# Patient Record
Sex: MALE | Employment: OTHER | URBAN - METROPOLITAN AREA
[De-identification: names, ages, dates, MRNs, and addresses within clinical notes are randomized per-mention and may not be internally consistent; named-entity substitution may affect disease eponyms.]

---

## 2017-08-01 ENCOUNTER — HOSPITAL ENCOUNTER (OUTPATIENT)
Age: 67
Setting detail: SPECIMEN
Discharge: HOME | End: 2017-08-01
Payer: MEDICARE

## 2017-08-04 LAB — SURGICAL PATHOLOGY REPORT: NORMAL

## 2017-08-24 ENCOUNTER — HOSPITAL ENCOUNTER (OUTPATIENT)
Age: 67
Discharge: HOME | End: 2017-08-24
Payer: MEDICARE

## 2017-08-24 ENCOUNTER — HOSPITAL ENCOUNTER (OUTPATIENT)
Age: 67
Setting detail: SPECIMEN
Discharge: HOME | End: 2017-08-24
Payer: MEDICARE

## 2017-08-24 PROCEDURE — 88305 TISSUE EXAM BY PATHOLOGIST: CPT

## 2017-08-28 LAB — SURGICAL PATHOLOGY REPORT: NORMAL

## 2017-09-01 ENCOUNTER — HOSPITAL ENCOUNTER (OUTPATIENT)
Age: 67
Setting detail: SPECIMEN
Discharge: HOME | End: 2017-09-01
Payer: MEDICARE

## 2017-09-07 LAB — SURGICAL PATHOLOGY REPORT: NORMAL

## 2020-07-15 ENCOUNTER — HOSPITAL ENCOUNTER (OUTPATIENT)
Dept: MRI IMAGING | Facility: CLINIC | Age: 70
Discharge: HOME | End: 2020-07-17
Payer: MEDICARE

## 2020-07-15 PROCEDURE — 73721 MRI JNT OF LWR EXTRE W/O DYE: CPT

## 2022-06-01 ENCOUNTER — OFFICE VISIT (OUTPATIENT)
Dept: ORTHOPEDIC SURGERY | Age: 72
End: 2022-06-01

## 2022-06-01 VITALS — WEIGHT: 305 LBS | RESPIRATION RATE: 16 BRPM | BODY MASS INDEX: 42.7 KG/M2 | HEIGHT: 71 IN

## 2022-06-01 DIAGNOSIS — M25.562 LEFT KNEE PAIN, UNSPECIFIED CHRONICITY: Primary | ICD-10-CM

## 2022-06-01 PROCEDURE — 99204 OFFICE O/P NEW MOD 45 MIN: CPT | Performed by: ORTHOPAEDIC SURGERY

## 2022-06-01 RX ORDER — BISOPROLOL FUMARATE 5 MG/1
TABLET ORAL
COMMUNITY
Start: 2020-09-09 | End: 2022-07-21

## 2022-06-01 RX ORDER — FLUTICASONE PROPIONATE 50 MCG
SPRAY, SUSPENSION (ML) NASAL
COMMUNITY
Start: 2020-07-07

## 2022-06-01 RX ORDER — BISOPROLOL FUMARATE AND HYDROCHLOROTHIAZIDE 5; 6.25 MG/1; MG/1
TABLET ORAL
COMMUNITY
Start: 2022-04-28 | End: 2022-07-21

## 2022-06-01 RX ORDER — ZOLPIDEM TARTRATE 10 MG/1
10 TABLET ORAL NIGHTLY PRN
COMMUNITY
Start: 2022-05-09

## 2022-06-01 RX ORDER — MESALAMINE 1.2 G/1
1200 TABLET, DELAYED RELEASE ORAL
COMMUNITY
Start: 2022-05-03

## 2022-06-01 RX ORDER — HYDROCODONE BITARTRATE AND ACETAMINOPHEN 5; 325 MG/1; MG/1
1 TABLET ORAL PRN
COMMUNITY
Start: 2022-04-19

## 2022-06-01 ASSESSMENT — ENCOUNTER SYMPTOMS
SHORTNESS OF BREATH: 0
EYE DISCHARGE: 0
ROS SKIN COMMENTS: NEGATIVE FOR RASH
ABDOMINAL PAIN: 0

## 2022-06-01 NOTE — PROGRESS NOTES
[address] [facility] ORTHOPEDICS AND SPORTS MEDICINE  [address] [facility] [address] [address]  Dept: [phone]    Ambulatory Orthopedic Consult      CHIEF COMPLAINT:    Chief Complaint   Patient presents with   Bailey Established New Doctor    Pain     left knee       HISTORY OF PRESENT ILLNESS:      The patient is a 67 y.o. male who is being seen at the request of  Mary Pinzon MD for consultation and evaluation of knee pain. Patient reportedly has had progressively worsening left knee pain over the last 20 years. He has had multiple injections in the past and physical therapy. He has been seeing Dr. Jess Perry and in fact was in the process of scheduling a knee replacement when his insurance changed. He would like to consider total knee arthroplasty. The knee bothers him for usual activities of daily living, it is no longer improving with home exercise program and corticosteroid injections, and causing him mobility issues and the feeling that he may fall because of his knee. .      Past Medical History:    No past medical history on file. Past Surgical History:    No past surgical history on file. Current Medications:   Current Outpatient Medications   Medication Sig Dispense Refill    bisoprolol (ZEBETA) 5 MG tablet       bisoprolol-hydroCHLOROthiazide (ZIAC) 5-6.25 MG per tablet       vitamin D (CHOLECALCIFEROL) 125 MCG (5000 UT) CAPS capsule Take 5,000 Units by mouth      diclofenac (VOLTAREN) 50 MG EC tablet       fluticasone (FLONASE) 50 MCG/ACT nasal spray       HYDROcodone-acetaminophen (NORCO) 5-325 MG per tablet       zolpidem (AMBIEN) 10 MG tablet       mesalamine (LIALDA) 1.2 g EC tablet        No current facility-administered medications for this visit.        Allergies:    Adhesive tape    Social History:   Social History     Socioeconomic History    Marital status: Unknown     Spouse name: Not on file    Number of children: Not on file    Years of education: Not on file    Highest education level: Not on file   Occupational History    Not on file   Tobacco Use    Smoking status: Not on file    Smokeless tobacco: Not on file   Substance and Sexual Activity    Alcohol use: Not on file    Drug use: Not on file    Sexual activity: Not on file   Other Topics Concern    Not on file   Social History Narrative    Not on file     Social Determinants of Health     Financial Resource Strain:     Difficulty of Paying Living Expenses: Not on file   Food Insecurity:     Worried About Running Out of Food in the Last Year: Not on file    Faisal of Food in the Last Year: Not on file   Transportation Needs:     Lack of Transportation (Medical): Not on file    Lack of Transportation (Non-Medical): Not on file   Physical Activity:     Days of Exercise per Week: Not on file    Minutes of Exercise per Session: Not on file   Stress:     Feeling of Stress : Not on file   Social Connections:     Frequency of Communication with Friends and Family: Not on file    Frequency of Social Gatherings with Friends and Family: Not on file    Attends Sabianist Services: Not on file    Active Member of [address] [place] [address] or Organizations: Not on file    Attends Club or Organization Meetings: Not on file    Marital Status: Not on file   Intimate Partner Violence:     Fear of Current or Ex-Partner: Not on file    Emotionally Abused: Not on file    Physically Abused: Not on file    Sexually Abused: Not on file   Housing Stability:     Unable to Pay for Housing in the Last Year: Not on file    Number of [place] in the Last Year: Not on file    Unstable Housing in the Last Year: Not on file       Family History:  No family history on file. REVIEW OF SYSTEMS:  Review of Systems   Constitutional: Positive for activity change. Negative for fever. HENT: Negative for dental problem. Eyes: Negative for discharge.    Respiratory: Negative for shortness of breath. Cardiovascular: Negative for chest pain. Gastrointestinal: Negative for abdominal pain. Genitourinary: Negative. Musculoskeletal: Positive for arthralgias. Skin:        Negative for rash   Neurological: Positive for weakness. Psychiatric/Behavioral: Negative for confusion. I have reviewed the CC, HPI, ROS, PMH, FHX, Social History, and if not present in this note, I have reviewed in the patient's chart. I agree with the documentation provided by other staff and have reviewed their documentation prior to providing my signature indicating agreement. PHYSICAL EXAM:  Resp 16   Ht 5' 11\" (1.803 m)   Wt (!) 305 lb (138.3 kg)   BMI 42.54 kg/m²  Body mass index is 42.54 kg/m². Physical Exam  Gen: alert and oriented to person and place. Psych:  Appropriate affect; Appropriate knowledge base; Appropriate mood; No hallucinations; Head: normocephalic, atraumatic   Chest: symmetric chest excursion; nonlabored respiratory effort. Pelvis: stable; no obvious pelvis deformity  Ortho Exam  Extremity:Evaluation of the Left knee reveals no significant outward deformity. There is no erythema, warmth, skin lesions, signs of infection. There is tenderness over the medial joint line. There is a mildknee effusion. Range of motion of the Left knee is  5-full. No instability of the knee is appreciated at 0 and 30° of flexion. There is a negative anterior drawer Lachman's test.  There is increased pain with varus Kuar's testing. No calf tenderness is noted. There is a negative hip log roll and Stinchfield test.  Motor, sensory, vascular examination to the Left lower extremity is intact. Patient has full range of motion of the ankle. No pain at the patellofemoral joint is appreciated.       Radiology: XR KNEE LEFT (MIN 4 VIEWS)    Result Date: 6/1/2022  History:   Left knee pain Findings:   Standing AP/Lateral/Tunnel/Merchant view xrays of the Left done in the office today shows severe  medial joint space narrowing, tricompartmental osteophytosis, joint line sclerosis medially. No evidence of fracture, subluxation, dislocation, radioopaque foreign body/tumor is noted. Lateral subluxation of the tibia is appreciated. Impression:   Left knee severe  degenerative changes as described above. ASSESSMENT:     1. Left knee pain, unspecified chronicity         PLAN:       The patient would like to proceed with total knee arthroplasty on the left. We discussed not resurfacing the patella the patient notes understanding and states he has no patellofemoral pain and he would prefer a nonresurfaced patella. We also discussed cementing versus noncemented prosthesis. He notes understanding. All details of the procedure, as well as risks, benefits and alternatives, including the option of non operative versus operative treatment were discussed. The patient understands that risks of the surgery include but are not limited to: bleeding, malunion/nonunion, loss of fixation, loss of reduction, hardware failure, angular or rotational deformity, length discrepancy, limp, transfusion, skin blistering or breakdown, progressive post traumatic degenerative joint disease, possible need for further surgery, bone grafting, infection, nerve injury, paralysis, numbness, blood vessel injury, excessive scaring, wound complication or breakdown, failure of symptoms to improve or actual deterioration in condition, significant acute and/or chronic pain, possible need for amputation, permanent loss of motion, and permanent loss of function.   As well as the general complications of anesthesia, which include but are not limited to: myocardial infarction and/or heart attack, stroke, multi organ system failure or even possible death, prolonged hospital stay, blood clots, pulmonary embolism, abnormal reaction to medication, visual and neurological disturbances, constipation, ischemic bowel, bowel obstruction, bowel perforation, ileus and mental status changes. No guarantees were made. No follow-ups on file. No orders of the defined types were placed in this encounter.       Orders Placed This Encounter   Procedures    XR KNEE LEFT (MIN 4 VIEWS)     Standing Status:   Future     Number of Occurrences:   1     Standing Expiration Date:   5/31/2023       This note is created with the assistance of a speech recognition program.  While intending to generate a document that actually reflects the content of the visit, the document can still have some errors including those of syntax and sound a like substitutions which may escape proof reading.  In such instances, actual meaning can be extrapolated by contextual diversion      Electronically signed by Nora Abrams on 6/1/2022 at 10:22 AM

## 2022-06-01 NOTE — LETTER
[facility] Ankit Frias, [address] [facility]  [address] [address] [employer] [address] [place]  [facility] [address] [address]  Dept: [phone]  Dept Fax: [phone]        6/1/22    Patient: Sanjeev Quiroz  YOB: 1950    Dear Anisa Wilkerson MD,    I had the pleasure of seeing one of your patients, Kory Aldana today in the office. Below are the relevant portions of my assessment and plan of care. IMPRESSION:  1. Left knee pain, unspecified chronicity      PLAN:  The patient would like to proceed with total knee arthroplasty on the left. We discussed not resurfacing the patella the patient notes understanding and states he has no patellofemoral pain and he would prefer a nonresurfaced patella. We also discussed cementing versus noncemented prosthesis. He notes understanding. Thank you for allowing me to participate in the care of this patient. I will keep you updated on this patient's follow up and I look forward to serving you and your patients again in the future. Please don't hesitate to contact me at my mobile number [phone].         Consuelo Stevens

## 2022-06-15 ENCOUNTER — TELEPHONE (OUTPATIENT)
Dept: ORTHOPEDIC SURGERY | Age: 72
End: 2022-06-15

## 2022-06-15 DIAGNOSIS — Z01.818 PRE-OP TESTING: Primary | ICD-10-CM

## 2022-07-21 ENCOUNTER — HOSPITAL ENCOUNTER (OUTPATIENT)
Dept: GENERAL RADIOLOGY | Age: 72
Discharge: HOME | End: 2022-07-23
Payer: MEDICARE

## 2022-07-21 ENCOUNTER — HOSPITAL ENCOUNTER (OUTPATIENT)
Dept: PREADMISSION TESTING | Age: 72
Discharge: HOME | End: 2022-07-25
Payer: MEDICARE

## 2022-07-21 VITALS
BODY MASS INDEX: 37.25 KG/M2 | DIASTOLIC BLOOD PRESSURE: 90 MMHG | HEIGHT: 72 IN | WEIGHT: 275 LBS | HEART RATE: 75 BPM | TEMPERATURE: 97.7 F | OXYGEN SATURATION: 97 % | SYSTOLIC BLOOD PRESSURE: 147 MMHG | RESPIRATION RATE: 20 BRPM

## 2022-07-21 DIAGNOSIS — Z01.818 PRE-OP TESTING: ICD-10-CM

## 2022-07-21 LAB
-: ABNORMAL
ABO/RH: NORMAL
ALBUMIN SERPL-MCNC: 3.8 G/DL (ref 3.5–5.2)
ALP BLD-CCNC: 88 U/L (ref 40–129)
ALT SERPL-CCNC: 25 U/L (ref 5–41)
ANION GAP SERPL CALCULATED.3IONS-SCNC: 9 MMOL/L (ref 9–17)
ANTIBODY SCREEN: NEGATIVE
ARM BAND NUMBER: NORMAL
AST SERPL-CCNC: 27 U/L
BILIRUB SERPL-MCNC: 0.48 MG/DL (ref 0.3–1.2)
BILIRUBIN URINE: NEGATIVE
BUN BLDV-MCNC: 22 MG/DL (ref 8–23)
BUN/CREAT BLD: 16 (ref 9–20)
CALCIUM SERPL-MCNC: 9.4 MG/DL (ref 8.6–10.4)
CASTS UA: ABNORMAL /LPF
CASTS UA: ABNORMAL /LPF
CHLORIDE BLD-SCNC: 105 MMOL/L (ref 98–107)
CO2: 27 MMOL/L (ref 20–31)
COLOR: YELLOW
CREAT SERPL-MCNC: 1.36 MG/DL (ref 0.7–1.2)
EPITHELIAL CELLS UA: ABNORMAL /HPF (ref 0–5)
EXPIRATION DATE: NORMAL
GFR AFRICAN AMERICAN: >60 ML/MIN
GFR NON-AFRICAN AMERICAN: 51 ML/MIN
GFR SERPL CREATININE-BSD FRML MDRD: ABNORMAL ML/MIN/{1.73_M2}
GLUCOSE BLD-MCNC: 92 MG/DL (ref 70–99)
GLUCOSE URINE: NEGATIVE
HCT VFR BLD CALC: 47.5 % (ref 40.7–50.3)
HEMOGLOBIN: 15.3 G/DL (ref 13–17)
KETONES, URINE: ABNORMAL
LEUKOCYTE ESTERASE, URINE: NEGATIVE
MCH RBC QN AUTO: 30.8 PG (ref 25.2–33.5)
MCHC RBC AUTO-ENTMCNC: 32.2 G/DL (ref 28.4–34.8)
MCV RBC AUTO: 95.6 FL (ref 82.6–102.9)
MUCUS: ABNORMAL
NITRITE, URINE: NEGATIVE
NRBC AUTOMATED: 0 PER 100 WBC
PDW BLD-RTO: 12 % (ref 11.8–14.4)
PH UA: 5.5 (ref 5–8)
PLATELET # BLD: 212 K/UL (ref 138–453)
PMV BLD AUTO: 10.3 FL (ref 8.1–13.5)
POTASSIUM SERPL-SCNC: 4.7 MMOL/L (ref 3.7–5.3)
PROTEIN UA: ABNORMAL
RBC # BLD: 4.97 M/UL (ref 4.21–5.77)
RBC UA: ABNORMAL /HPF (ref 0–2)
SODIUM BLD-SCNC: 141 MMOL/L (ref 135–144)
SPECIFIC GRAVITY UA: 1.03 (ref 1–1.03)
TOTAL PROTEIN: 6.9 G/DL (ref 6.4–8.3)
TURBIDITY: ABNORMAL
URINE HGB: NEGATIVE
UROBILINOGEN, URINE: NORMAL
WBC # BLD: 7 K/UL (ref 3.5–11.3)
WBC UA: ABNORMAL /HPF (ref 0–5)

## 2022-07-21 PROCEDURE — 87641 MR-STAPH DNA AMP PROBE: CPT

## 2022-07-21 PROCEDURE — 71046 X-RAY EXAM CHEST 2 VIEWS: CPT

## 2022-07-21 PROCEDURE — 81001 URINALYSIS AUTO W/SCOPE: CPT

## 2022-07-21 PROCEDURE — 85027 COMPLETE CBC AUTOMATED: CPT

## 2022-07-21 PROCEDURE — 86850 RBC ANTIBODY SCREEN: CPT

## 2022-07-21 PROCEDURE — 86900 BLOOD TYPING SEROLOGIC ABO: CPT

## 2022-07-21 PROCEDURE — 86901 BLOOD TYPING SEROLOGIC RH(D): CPT

## 2022-07-21 PROCEDURE — 80053 COMPREHEN METABOLIC PANEL: CPT

## 2022-07-21 PROCEDURE — 93005 ELECTROCARDIOGRAM TRACING: CPT

## 2022-07-21 PROCEDURE — 36415 COLL VENOUS BLD VENIPUNCTURE: CPT

## 2022-07-21 RX ORDER — CELECOXIB 200 MG/1
200 CAPSULE ORAL ONCE
Status: CANCELLED | OUTPATIENT
Start: 2022-08-02

## 2022-07-21 RX ORDER — GABAPENTIN 300 MG/1
300 CAPSULE ORAL ONCE
Status: CANCELLED | OUTPATIENT
Start: 2022-08-02

## 2022-07-21 RX ORDER — ACETAMINOPHEN 500 MG
1000 TABLET ORAL ONCE
Status: CANCELLED | OUTPATIENT
Start: 2022-08-02

## 2022-07-21 ASSESSMENT — PROMIS GLOBAL HEALTH SCALE
TO WHAT EXTENT ARE YOU ABLE TO CARRY OUT YOUR EVERYDAY PHYSICAL ACTIVITIES SUCH AS WALKING, CLIMBING STAIRS, CARRYING GROCERIES, OR MOVING A CHAIR [ON A SCALE OF 1 (NOT AT ALL) TO 5 (COMPLETELY)]?: 3
SUM OF RESPONSES TO QUESTIONS 3, 6, 7, & 8: 12
IN GENERAL, HOW WOULD YOU RATE YOUR SATISFACTION WITH YOUR SOCIAL ACTIVITIES AND RELATIONSHIPS [ON A SCALE OF 1 (POOR) TO 5 (EXCELLENT)]?: 5
SUM OF RESPONSES TO QUESTIONS 2, 4, 5, & 10: 14
IN GENERAL, PLEASE RATE HOW WELL YOU CARRY OUT YOUR USUAL SOCIAL ACTIVITIES (INCLUDES ACTIVITIES AT HOME, AT WORK, AND IN YOUR COMMUNITY, AND RESPONSIBILITIES AS A PARENT, CHILD, SPOUSE, EMPLOYEE, FRIEND, ETC) [ON A SCALE OF 1 (POOR) TO 5 (EXCELLENT)]?: 4
IN GENERAL, HOW WOULD YOU RATE YOUR MENTAL HEALTH, INCLUDING YOUR MOOD AND YOUR ABILITY TO THINK [ON A SCALE OF 1 (POOR) TO 5 (EXCELLENT)]?: 5
HOW IS THE PROMIS V1.1 BEING ADMINISTERED?: 0
IN THE PAST 7 DAYS, HOW OFTEN HAVE YOU BEEN BOTHERED BY EMOTIONAL PROBLEMS, SUCH AS FEELING ANXIOUS, DEPRESSED, OR IRRITABLE [ON A SCALE FROM 1 (NEVER) TO 5 (ALWAYS)]?: 1
IN GENERAL, WOULD YOU SAY YOUR HEALTH IS...[ON A SCALE OF 1 (POOR) TO 5 (EXCELLENT)]: 3
IN THE PAST 7 DAYS, HOW WOULD YOU RATE YOUR FATIGUE ON AVERAGE [ON A SCALE FROM 1 (NONE) TO 5 (VERY SEVERE)]?: 4
IN GENERAL, HOW WOULD YOU RATE YOUR PHYSICAL HEALTH [ON A SCALE OF 1 (POOR) TO 5 (EXCELLENT)]?: 3
WHO IS THE PERSON COMPLETING THE PROMIS V1.1 SURVEY?: 0
IN THE PAST 7 DAYS, HOW WOULD YOU RATE YOUR PAIN ON AVERAGE [ON A SCALE FROM 0 (NO PAIN) TO 10 (WORST IMAGINABLE PAIN)]?: 2
IN GENERAL, WOULD YOU SAY YOUR QUALITY OF LIFE IS...[ON A SCALE OF 1 (POOR) TO 5 (EXCELLENT)]: 3

## 2022-07-21 ASSESSMENT — KOOS JR
KOOS JR TOTAL INTERVAL SCORE: 52.465
STANDING UPRIGHT: 2
HOW SEVERE IS YOUR KNEE STIFFNESS AFTER FIRST WAKING IN MORNING: 2
BENDING TO THE FLOOR TO PICK UP OBJECT: 2
RISING FROM SITTING: 2
TWISING OR PIVOTING ON KNEE: 2
STRAIGHTENING KNEE FULLY: 2
GOING UP OR DOWN STAIRS: 2

## 2022-07-21 ASSESSMENT — PAIN DESCRIPTION - DESCRIPTORS: DESCRIPTORS: ACHING;CRUSHING

## 2022-07-21 ASSESSMENT — PAIN DESCRIPTION - LOCATION: LOCATION: KNEE

## 2022-07-21 ASSESSMENT — PAIN SCALES - GENERAL: PAINLEVEL_OUTOF10: 1

## 2022-07-21 ASSESSMENT — PAIN DESCRIPTION - ORIENTATION: ORIENTATION: LEFT

## 2022-07-21 NOTE — PRE-PROCEDURE INSTRUCTIONS
[address] [facility] ON August 2nd  Surgery time at 8:30  We will confirm time of arrival day before surgery    Once you enter the hospital lobby, take the elevators to the second floor. Check-In is at the surgery registration desk. Continue to take your home medications as you normally do up to and including the night before surgery with the exception of any blood thinning medications. Please stop any blood thinning medications as directed by your surgeon or prescribing physician. Failure to stop certain medications may interfere with your scheduled surgery. These may include:  Aspirin, Warfarin (Coumadin), Clopidogrel (Plavix), Ibuprofen (Motrin, Advil), Naproxen (Aleve), Meloxicam (Mobic), Celecoxib (Celebrex), Eliquis, Pradaxa, Xarelto, Effient, Fish Oil, Herbal supplements. Voltaren    Please take the following medication(s) the day of surgery with a small sip of water:none        PREPARING FOR YOUR SURGERY:     Before surgery, you can play an important role in your own health. Because skin is not sterile, we need to be sure that your skin is as free of germs as possible before surgery by carefully washing before surgery. Preparing or prepping skin before surgery can reduce the risk of a surgical site infection.   Do not shave the area of your body where your surgery will be performed unless you received specific permission from your physician. You will need to shower at home the night before surgery and the morning of surgery with a special soap called chlorhexidine gluconate (CHG*). *Not to be used by people allergic to Chlorhexidine Gluconate (CHG). Following these instructions will help you be sure that your skin is clean before surgery. Instructions on cleaning your skin before surgery: The night before your surgery: You will need to shower with warm water (not hot) and the CHG soap. Use a clean wash cloth and a clean towel.   Have clean clothes available to put on after the shower. First wash your hair with regular shampoo. Rinse your hair and body thoroughly to remove the shampoo. Wash your face with your regular soap or water only. Thoroughly rinse your body with warm water from the neck down. Turn water off to prevent rinsing the soap off too soon. With a clean wet washcloth and half of the CHG soap in the bottle, lather your entire body from the neck down. Do not use CHG soap near your eyes or ears to avoid injury to those areas. Wash thoroughly, paying special attention to the area where your surgery will be performed. Wash your body gently for five (5) minutes. Avoid scrubbing your skin too hard. Turn the water back on and rinse your body thoroughly. Pat yourself dry with a clean, soft towel. Do not apply lotion, cream or powder. Dress with clean freshly washed clothes. The morning of surgery:    Repeat shower following steps above - using remaining half of CHG soap in bottle. Patient Instructions: If you are having any type of anesthesia you are to have nothing to eat or drink after midnight the night before your surgery. This includes gum, mints, water or smoking or chewing tobacco.  The only exception to this is a small sip of water to take with any morning dose of heart, blood pressure, or seizure medications. No alcoholic beverages for 24 hours prior to surgery. Brush your teeth but do not swallow water. Bring your eyeglasses and case with you. No contacts are to be worn the day of surgery. You also may bring your hearing aids. Most surgical procedures involving anesthesia will require that you remove your dentures prior to surgery. Do not wear any jewelry or body piercings day of surgery. Also, NO lotion, perfume or deodorant to be used the day of surgery. No nail polish on the operative extremity (arm/leg surgeries)    Do not bring any valuables such as jewelry, cash, or credit cards.   If you are staying overnight with us, please bring a small bag of personal items. Please wear loose, comfortable clothing. If you are potentially going to have a cast or brace bring clothing that will fit over them. In case of illness - If you have cold or flu like symptoms (high fever, runny nose, sore throat, cough, etc.) rash, nausea, vomiting, loose stools, and/or recent contact with someone who has a contagious disease (chicken pox, measles, etc.) Please call your doctor before coming to the hospital.     Day of Surgery/Procedure:    As a patient at [facility] you can expect quality medical and nursing care that is centered on your individual needs. Our goal is to make your surgical experience as comfortable as possible    . Transportation After Your Surgery/Procedure: You will need a friend or family member to drive you home after your procedure. Your  must be 25years of age or older and able to sign off on your discharge instructions. A taxi cab or any other form of public transportation is not acceptable. Your friend or family member must stay at the hospital throughout your procedure. Someone must remain with you for the first 24 hours after your surgery if you receive anesthesia or medication. If you do not have someone to stay with you, your procedure may be cancelled.       If you have any other questions regarding your procedure or the day of surgery, please call [phone]      _________________________  ____________________________  Signature (Patient)              Signature (Provider) & date

## 2022-07-22 LAB
EKG ATRIAL RATE: 66 BPM
EKG P AXIS: 8 DEGREES
EKG P-R INTERVAL: 240 MS
EKG Q-T INTERVAL: 400 MS
EKG QRS DURATION: 88 MS
EKG QTC CALCULATION (BAZETT): 419 MS
EKG R AXIS: -2 DEGREES
EKG T AXIS: 20 DEGREES
EKG VENTRICULAR RATE: 66 BPM
MRSA, DNA, NASAL: NEGATIVE
SPECIMEN DESCRIPTION: NORMAL

## 2022-07-22 PROCEDURE — 93010 ELECTROCARDIOGRAM REPORT: CPT | Performed by: INTERNAL MEDICINE

## 2022-07-25 NOTE — PROGRESS NOTES
[facility] ([facility]) Joint Replacement Pre-surgical Assessment    Scheduled Surgery Date: 08/02/2022  Surgery Time: 0830    Surgeon: Iban Parrish  Procedure: left Total Knee    Primary Insurance Coverage [employer]  Pre-op class attended YES    PCP: Simone Toscano MD  Clearance received by PCP: Yes    Anticipated Discharge Plan: home  Agency (if applicable): PT REQUESTING [address] VS SNF. Significant PMH:   Surgical History    Procedure Laterality Date Comment Source   ANKLE SURGERY Right      CATARACT REMOVAL WITH IMPLANT Bilateral  left with implant    CHOLECYSTECTOMY       COLONOSCOPY       ENDOSCOPY, COLON, DIAGNOSTIC       EYE SURGERY       HIP ARTHROPLASTY       JOINT REPLACEMENT       SKIN BIOPSY       TONSILLECTOMY           ED Notes    ED Notes      Medical History    Diagnosis Date Comment Source   Cancer [facility])      Hypertension      Kidney stones      Neck complaint  crack at C6, herniated C5,C6,C7, Bulged at L5,S1    Skin cancer      UC (ulcerative colitis) ([facility] [address]             Smoking history: none    Alcohol history: Never drinks    Concerns prior to surgery: PT MET WITH PT AFTER CLASS.     Electronically signed by: Jaqui Maguire RN on 7/25/2022 at 12:42 PM

## 2022-08-01 ENCOUNTER — ANESTHESIA EVENT (OUTPATIENT)
Dept: OPERATING ROOM | Age: 72
End: 2022-08-01
Payer: MEDICARE

## 2022-08-01 NOTE — PLAN OF CARE
joints. (If allergic to aspirin, give eliquis 2.5mg BID X 14 days (knees) or 35 days  (hips) starting first day postop at 0600). [x]  DVT Prophylaxis:  Class BX (nery choice)    [x]Eliquis 2.5mg BID x 14 days (knees) or 35 days (hips) starting first day postop      at 0600      [] Lovenox 40 mg subcu daily starting first day postop at 0600 x 14 days                             (knees) or 35 days (hips)    Ecotrin 81 mg PO BID-start when Lovenox is finished. (Teach injection prior to discharge.)       [] Xarelto 10 mg by mouth daily starting first day postop at 0600     14 days (knees) or 35 days (hips). If creatinine clearance less than 30 mL/min, give Lovenox 30 mg subcu   Daily starting first day postop at 0600. Ecotrin 81 mg PO BID-starting   when Lovenox is finished. (Teach injection prior to      discharge.)  (For discharge fill throughout the 10 mg daily with no    refills.)      []  DVT Prophylaxis:  Class BY (nery choice)               []  Eliquis 2.5mg BID x 14 days (knees) or 35 days (hips) starting first day                              postop at 0600        []  Ecotrin 81 mg PO BID x 6weeks (all joints)      []  Lovenox 40mg SQ daily to start at 0600 first day post-Op day. 14 days for knees, 35 days for hips    Ecotrin 81 mg PO BID - start when Lovenox is finished. (Teach injection prior to discharge.)       [] Xarelto 10 mg PO daily starting first day Post-Op at 0600    14 days (knees) or 35 days (hips)    If Creatinine Clearance less than 30ml/min, give Lovenox 30 mg    SQ daily starting first day Post-Op at 0600 x 14 days (knees) or 35   days (hips). Ecotrin 81 mg PO BID - start when Lovenox is finished.     (Teach injection prior to discharge.)  (For discharge fill throughout the   10 mg daily #32 with no refills.)      Electronically signed by Jonathon Del Rio DO on 8/1/2022 at 3:08 PM

## 2022-08-02 ENCOUNTER — HOSPITAL ENCOUNTER (OUTPATIENT)
Age: 72
Discharge: NURSING, REHAB OR LONG-TERM CARE | End: 2022-08-04
Attending: ORTHOPAEDIC SURGERY | Admitting: ORTHOPAEDIC SURGERY
Payer: MEDICARE

## 2022-08-02 ENCOUNTER — ANESTHESIA (OUTPATIENT)
Dept: OPERATING ROOM | Age: 72
End: 2022-08-02
Payer: MEDICARE

## 2022-08-02 ENCOUNTER — APPOINTMENT (OUTPATIENT)
Dept: GENERAL RADIOLOGY | Age: 72
End: 2022-08-02
Attending: ORTHOPAEDIC SURGERY
Payer: MEDICARE

## 2022-08-02 DIAGNOSIS — G89.18 POST-OP PAIN: Primary | ICD-10-CM

## 2022-08-02 PROBLEM — Z96.652 S/P TOTAL KNEE ARTHROPLASTY, LEFT: Status: ACTIVE | Noted: 2022-08-02

## 2022-08-02 PROBLEM — M17.12 ARTHRITIS OF LEFT KNEE: Status: ACTIVE | Noted: 2022-08-02

## 2022-08-02 LAB
HCT VFR BLD CALC: 44.1 % (ref 40.7–50.3)
HCT VFR BLD CALC: 47.3 % (ref 40.7–50.3)
HEMOGLOBIN: 14.4 G/DL (ref 13–17)
HEMOGLOBIN: 15.2 G/DL (ref 13–17)
MCH RBC QN AUTO: 30.4 PG (ref 25.2–33.5)
MCHC RBC AUTO-ENTMCNC: 32.1 G/DL (ref 28.4–34.8)
MCV RBC AUTO: 94.6 FL (ref 82.6–102.9)
NRBC AUTOMATED: 0 PER 100 WBC
PDW BLD-RTO: 12.3 % (ref 11.8–14.4)
PLATELET # BLD: 233 K/UL (ref 138–453)
PMV BLD AUTO: 9.4 FL (ref 8.1–13.5)
RBC # BLD: 5 M/UL (ref 4.21–5.77)
WBC # BLD: 7.3 K/UL (ref 3.5–11.3)

## 2022-08-02 PROCEDURE — 85014 HEMATOCRIT: CPT

## 2022-08-02 PROCEDURE — 7100000001 HC PACU RECOVERY - ADDTL 15 MIN: Performed by: ORTHOPAEDIC SURGERY

## 2022-08-02 PROCEDURE — C9290 INJ, BUPIVACAINE LIPOSOME: HCPCS | Performed by: ANESTHESIOLOGY

## 2022-08-02 PROCEDURE — 6360000002 HC RX W HCPCS: Performed by: NURSE ANESTHETIST, CERTIFIED REGISTERED

## 2022-08-02 PROCEDURE — 73562 X-RAY EXAM OF KNEE 3: CPT

## 2022-08-02 PROCEDURE — 99218 PR INITIAL OBSERVATION CARE/DAY 30 MINUTES: CPT | Performed by: NURSE PRACTITIONER

## 2022-08-02 PROCEDURE — 3700000000 HC ANESTHESIA ATTENDED CARE: Performed by: ORTHOPAEDIC SURGERY

## 2022-08-02 PROCEDURE — 6360000002 HC RX W HCPCS: Performed by: STUDENT IN AN ORGANIZED HEALTH CARE EDUCATION/TRAINING PROGRAM

## 2022-08-02 PROCEDURE — 3700000001 HC ADD 15 MINUTES (ANESTHESIA): Performed by: ORTHOPAEDIC SURGERY

## 2022-08-02 PROCEDURE — 97110 THERAPEUTIC EXERCISES: CPT

## 2022-08-02 PROCEDURE — 97162 PT EVAL MOD COMPLEX 30 MIN: CPT

## 2022-08-02 PROCEDURE — 6360000002 HC RX W HCPCS: Performed by: ANESTHESIOLOGY

## 2022-08-02 PROCEDURE — 6370000000 HC RX 637 (ALT 250 FOR IP): Performed by: STUDENT IN AN ORGANIZED HEALTH CARE EDUCATION/TRAINING PROGRAM

## 2022-08-02 PROCEDURE — 6370000000 HC RX 637 (ALT 250 FOR IP)

## 2022-08-02 PROCEDURE — 64447 NJX AA&/STRD FEMORAL NRV IMG: CPT | Performed by: ANESTHESIOLOGY

## 2022-08-02 PROCEDURE — 2720000010 HC SURG SUPPLY STERILE: Performed by: ORTHOPAEDIC SURGERY

## 2022-08-02 PROCEDURE — 7100000000 HC PACU RECOVERY - FIRST 15 MIN: Performed by: ORTHOPAEDIC SURGERY

## 2022-08-02 PROCEDURE — 2500000003 HC RX 250 WO HCPCS: Performed by: NURSE ANESTHETIST, CERTIFIED REGISTERED

## 2022-08-02 PROCEDURE — 7100000010 HC PHASE II RECOVERY - FIRST 15 MIN: Performed by: ORTHOPAEDIC SURGERY

## 2022-08-02 PROCEDURE — 3600000015 HC SURGERY LEVEL 5 ADDTL 15MIN: Performed by: ORTHOPAEDIC SURGERY

## 2022-08-02 PROCEDURE — 2580000003 HC RX 258: Performed by: STUDENT IN AN ORGANIZED HEALTH CARE EDUCATION/TRAINING PROGRAM

## 2022-08-02 PROCEDURE — 6360000002 HC RX W HCPCS: Performed by: ORTHOPAEDIC SURGERY

## 2022-08-02 PROCEDURE — 85018 HEMOGLOBIN: CPT

## 2022-08-02 PROCEDURE — 97535 SELF CARE MNGMENT TRAINING: CPT

## 2022-08-02 PROCEDURE — 2709999900 HC NON-CHARGEABLE SUPPLY: Performed by: ORTHOPAEDIC SURGERY

## 2022-08-02 PROCEDURE — 2580000003 HC RX 258: Performed by: ANESTHESIOLOGY

## 2022-08-02 PROCEDURE — 6360000002 HC RX W HCPCS

## 2022-08-02 PROCEDURE — 85027 COMPLETE CBC AUTOMATED: CPT

## 2022-08-02 PROCEDURE — C1776 JOINT DEVICE (IMPLANTABLE): HCPCS | Performed by: ORTHOPAEDIC SURGERY

## 2022-08-02 PROCEDURE — 97530 THERAPEUTIC ACTIVITIES: CPT

## 2022-08-02 PROCEDURE — 3600000005 HC SURGERY LEVEL 5 BASE: Performed by: ORTHOPAEDIC SURGERY

## 2022-08-02 PROCEDURE — 27447 TOTAL KNEE ARTHROPLASTY: CPT | Performed by: ORTHOPAEDIC SURGERY

## 2022-08-02 PROCEDURE — 2500000003 HC RX 250 WO HCPCS: Performed by: ANESTHESIOLOGY

## 2022-08-02 PROCEDURE — 7100000011 HC PHASE II RECOVERY - ADDTL 15 MIN: Performed by: ORTHOPAEDIC SURGERY

## 2022-08-02 PROCEDURE — 97166 OT EVAL MOD COMPLEX 45 MIN: CPT

## 2022-08-02 PROCEDURE — 36415 COLL VENOUS BLD VENIPUNCTURE: CPT

## 2022-08-02 DEVICE — PSN TIB POR 2 PEG SZ G L: Type: IMPLANTABLE DEVICE | Site: KNEE | Status: FUNCTIONAL

## 2022-08-02 DEVICE — PSN FEM CR POR CCR STD SZ11 L: Type: IMPLANTABLE DEVICE | Site: KNEE | Status: FUNCTIONAL

## 2022-08-02 DEVICE — UPCHARGE KNEE VITAMIN E LINER ZIMMER BIOMET: Type: IMPLANTABLE DEVICE | Site: KNEE | Status: FUNCTIONAL

## 2022-08-02 DEVICE — PSN MC VE ASF L 10MM 8-11 GH: Type: IMPLANTABLE DEVICE | Site: KNEE | Status: FUNCTIONAL

## 2022-08-02 RX ORDER — ONDANSETRON 4 MG/1
4 TABLET, ORALLY DISINTEGRATING ORAL EVERY 8 HOURS PRN
Status: DISCONTINUED | OUTPATIENT
Start: 2022-08-02 | End: 2022-08-04 | Stop reason: HOSPADM

## 2022-08-02 RX ORDER — OXYCODONE HYDROCHLORIDE 5 MG/1
10 TABLET ORAL EVERY 4 HOURS PRN
Status: DISCONTINUED | OUTPATIENT
Start: 2022-08-02 | End: 2022-08-04 | Stop reason: HOSPADM

## 2022-08-02 RX ORDER — MIDAZOLAM HYDROCHLORIDE 1 MG/ML
INJECTION INTRAMUSCULAR; INTRAVENOUS
Status: COMPLETED
Start: 2022-08-02 | End: 2022-08-02

## 2022-08-02 RX ORDER — OXYCODONE HYDROCHLORIDE AND ACETAMINOPHEN 5; 325 MG/1; MG/1
1 TABLET ORAL EVERY 6 HOURS PRN
Qty: 28 TABLET | Refills: 0 | Status: SHIPPED | OUTPATIENT
Start: 2022-08-02 | End: 2022-08-09

## 2022-08-02 RX ORDER — OXYCODONE HYDROCHLORIDE 5 MG/1
5 TABLET ORAL EVERY 4 HOURS PRN
Status: DISCONTINUED | OUTPATIENT
Start: 2022-08-02 | End: 2022-08-04 | Stop reason: HOSPADM

## 2022-08-02 RX ORDER — EPHEDRINE SULFATE/0.9% NACL/PF 50 MG/5 ML
SYRINGE (ML) INTRAVENOUS PRN
Status: DISCONTINUED | OUTPATIENT
Start: 2022-08-02 | End: 2022-08-02 | Stop reason: SDUPTHER

## 2022-08-02 RX ORDER — CEFAZOLIN SODIUM 1 G/3ML
INJECTION, POWDER, FOR SOLUTION INTRAMUSCULAR; INTRAVENOUS PRN
Status: DISCONTINUED | OUTPATIENT
Start: 2022-08-02 | End: 2022-08-02 | Stop reason: SDUPTHER

## 2022-08-02 RX ORDER — HYDROCODONE BITARTRATE AND ACETAMINOPHEN 5; 325 MG/1; MG/1
1 TABLET ORAL EVERY 6 HOURS PRN
Qty: 28 TABLET | Refills: 0 | Status: SHIPPED | OUTPATIENT
Start: 2022-08-02 | End: 2022-08-02 | Stop reason: HOSPADM

## 2022-08-02 RX ORDER — VANCOMYCIN HYDROCHLORIDE 1 G/20ML
INJECTION, POWDER, LYOPHILIZED, FOR SOLUTION INTRAVENOUS PRN
Status: DISCONTINUED | OUTPATIENT
Start: 2022-08-02 | End: 2022-08-02 | Stop reason: ALTCHOICE

## 2022-08-02 RX ORDER — ONDANSETRON 2 MG/ML
4 INJECTION INTRAMUSCULAR; INTRAVENOUS EVERY 6 HOURS PRN
Status: DISCONTINUED | OUTPATIENT
Start: 2022-08-02 | End: 2022-08-04 | Stop reason: HOSPADM

## 2022-08-02 RX ORDER — HYDRALAZINE HYDROCHLORIDE 20 MG/ML
INJECTION INTRAMUSCULAR; INTRAVENOUS PRN
Status: DISCONTINUED | OUTPATIENT
Start: 2022-08-02 | End: 2022-08-02 | Stop reason: SDUPTHER

## 2022-08-02 RX ORDER — TRANEXAMIC ACID 100 MG/ML
INJECTION, SOLUTION INTRAVENOUS
Status: COMPLETED
Start: 2022-08-02 | End: 2022-08-02

## 2022-08-02 RX ORDER — CELECOXIB 200 MG/1
200 CAPSULE ORAL ONCE
Status: DISCONTINUED | OUTPATIENT
Start: 2022-08-02 | End: 2022-08-02

## 2022-08-02 RX ORDER — VANCOMYCIN HYDROCHLORIDE 1 G/20ML
INJECTION, POWDER, LYOPHILIZED, FOR SOLUTION INTRAVENOUS
Status: DISPENSED
Start: 2022-08-02 | End: 2022-08-02

## 2022-08-02 RX ORDER — HYDROMORPHONE HYDROCHLORIDE 1 MG/ML
0.25 INJECTION, SOLUTION INTRAMUSCULAR; INTRAVENOUS; SUBCUTANEOUS EVERY 5 MIN PRN
Status: DISCONTINUED | OUTPATIENT
Start: 2022-08-02 | End: 2022-08-02 | Stop reason: HOSPADM

## 2022-08-02 RX ORDER — SODIUM CHLORIDE 0.9 % (FLUSH) 0.9 %
5-40 SYRINGE (ML) INJECTION PRN
Status: DISCONTINUED | OUTPATIENT
Start: 2022-08-02 | End: 2022-08-02 | Stop reason: HOSPADM

## 2022-08-02 RX ORDER — DEXAMETHASONE SODIUM PHOSPHATE 10 MG/ML
INJECTION, SOLUTION INTRAMUSCULAR; INTRAVENOUS PRN
Status: DISCONTINUED | OUTPATIENT
Start: 2022-08-02 | End: 2022-08-02 | Stop reason: SDUPTHER

## 2022-08-02 RX ORDER — HYDROCODONE BITARTRATE AND ACETAMINOPHEN 5; 325 MG/1; MG/1
1 TABLET ORAL ONCE
Status: COMPLETED | OUTPATIENT
Start: 2022-08-02 | End: 2022-08-02

## 2022-08-02 RX ORDER — FENTANYL CITRATE 50 UG/ML
INJECTION, SOLUTION INTRAMUSCULAR; INTRAVENOUS PRN
Status: DISCONTINUED | OUTPATIENT
Start: 2022-08-02 | End: 2022-08-02 | Stop reason: SDUPTHER

## 2022-08-02 RX ORDER — ONDANSETRON 2 MG/ML
4 INJECTION INTRAMUSCULAR; INTRAVENOUS
Status: COMPLETED | OUTPATIENT
Start: 2022-08-02 | End: 2022-08-02

## 2022-08-02 RX ORDER — KETAMINE HCL IN NACL, ISO-OSM 100MG/10ML
SYRINGE (ML) INJECTION PRN
Status: DISCONTINUED | OUTPATIENT
Start: 2022-08-02 | End: 2022-08-02 | Stop reason: SDUPTHER

## 2022-08-02 RX ORDER — SODIUM CHLORIDE 450 MG/100ML
INJECTION, SOLUTION INTRAVENOUS CONTINUOUS
Status: DISCONTINUED | OUTPATIENT
Start: 2022-08-02 | End: 2022-08-04 | Stop reason: HOSPADM

## 2022-08-02 RX ORDER — FLUTICASONE PROPIONATE 50 MCG
1 SPRAY, SUSPENSION (ML) NASAL DAILY
Status: DISCONTINUED | OUTPATIENT
Start: 2022-08-02 | End: 2022-08-04 | Stop reason: HOSPADM

## 2022-08-02 RX ORDER — SODIUM CHLORIDE 9 MG/ML
INJECTION, SOLUTION INTRAVENOUS PRN
Status: DISCONTINUED | OUTPATIENT
Start: 2022-08-02 | End: 2022-08-04 | Stop reason: HOSPADM

## 2022-08-02 RX ORDER — DOCUSATE SODIUM 100 MG/1
100 CAPSULE, LIQUID FILLED ORAL 2 TIMES DAILY PRN
Qty: 60 CAPSULE | Refills: 0 | Status: SHIPPED | OUTPATIENT
Start: 2022-08-02

## 2022-08-02 RX ORDER — ACETAMINOPHEN 325 MG/1
650 TABLET ORAL EVERY 6 HOURS SCHEDULED
Status: DISCONTINUED | OUTPATIENT
Start: 2022-08-02 | End: 2022-08-04 | Stop reason: HOSPADM

## 2022-08-02 RX ORDER — MIDAZOLAM HYDROCHLORIDE 1 MG/ML
2 INJECTION INTRAMUSCULAR; INTRAVENOUS ONCE
Status: COMPLETED | OUTPATIENT
Start: 2022-08-02 | End: 2022-08-02

## 2022-08-02 RX ORDER — SODIUM CHLORIDE, SODIUM LACTATE, POTASSIUM CHLORIDE, CALCIUM CHLORIDE 600; 310; 30; 20 MG/100ML; MG/100ML; MG/100ML; MG/100ML
INJECTION, SOLUTION INTRAVENOUS CONTINUOUS
Status: DISCONTINUED | OUTPATIENT
Start: 2022-08-03 | End: 2022-08-02

## 2022-08-02 RX ORDER — SODIUM CHLORIDE 0.9 % (FLUSH) 0.9 %
5-40 SYRINGE (ML) INJECTION PRN
Status: DISCONTINUED | OUTPATIENT
Start: 2022-08-02 | End: 2022-08-04 | Stop reason: HOSPADM

## 2022-08-02 RX ORDER — ZOLPIDEM TARTRATE 5 MG/1
5 TABLET ORAL NIGHTLY PRN
Status: DISCONTINUED | OUTPATIENT
Start: 2022-08-02 | End: 2022-08-04 | Stop reason: HOSPADM

## 2022-08-02 RX ORDER — SODIUM CHLORIDE 0.9 % (FLUSH) 0.9 %
5-40 SYRINGE (ML) INJECTION EVERY 12 HOURS SCHEDULED
Status: DISCONTINUED | OUTPATIENT
Start: 2022-08-02 | End: 2022-08-02 | Stop reason: HOSPADM

## 2022-08-02 RX ORDER — LIDOCAINE HYDROCHLORIDE 10 MG/ML
1 INJECTION, SOLUTION EPIDURAL; INFILTRATION; INTRACAUDAL; PERINEURAL
Status: DISCONTINUED | OUTPATIENT
Start: 2022-08-03 | End: 2022-08-02 | Stop reason: HOSPADM

## 2022-08-02 RX ORDER — SODIUM CHLORIDE 9 MG/ML
INJECTION, SOLUTION INTRAVENOUS CONTINUOUS
Status: DISCONTINUED | OUTPATIENT
Start: 2022-08-03 | End: 2022-08-02

## 2022-08-02 RX ORDER — ACETAMINOPHEN 500 MG
1000 TABLET ORAL ONCE
Status: DISCONTINUED | OUTPATIENT
Start: 2022-08-02 | End: 2022-08-02

## 2022-08-02 RX ORDER — BUPIVACAINE HYDROCHLORIDE 2.5 MG/ML
INJECTION, SOLUTION EPIDURAL; INFILTRATION; INTRACAUDAL
Status: COMPLETED | OUTPATIENT
Start: 2022-08-02 | End: 2022-08-02

## 2022-08-02 RX ORDER — ONDANSETRON 2 MG/ML
INJECTION INTRAMUSCULAR; INTRAVENOUS PRN
Status: DISCONTINUED | OUTPATIENT
Start: 2022-08-02 | End: 2022-08-02 | Stop reason: SDUPTHER

## 2022-08-02 RX ORDER — HYDROCODONE BITARTRATE AND ACETAMINOPHEN 5; 325 MG/1; MG/1
TABLET ORAL
Status: COMPLETED
Start: 2022-08-02 | End: 2022-08-02

## 2022-08-02 RX ORDER — PROPOFOL 10 MG/ML
INJECTION, EMULSION INTRAVENOUS PRN
Status: DISCONTINUED | OUTPATIENT
Start: 2022-08-02 | End: 2022-08-02 | Stop reason: SDUPTHER

## 2022-08-02 RX ORDER — FENTANYL CITRATE 50 UG/ML
25 INJECTION, SOLUTION INTRAMUSCULAR; INTRAVENOUS EVERY 5 MIN PRN
Status: DISCONTINUED | OUTPATIENT
Start: 2022-08-02 | End: 2022-08-02 | Stop reason: HOSPADM

## 2022-08-02 RX ORDER — LIDOCAINE HYDROCHLORIDE 20 MG/ML
INJECTION, SOLUTION EPIDURAL; INFILTRATION; INTRACAUDAL; PERINEURAL PRN
Status: DISCONTINUED | OUTPATIENT
Start: 2022-08-02 | End: 2022-08-02 | Stop reason: SDUPTHER

## 2022-08-02 RX ORDER — SODIUM CHLORIDE 9 MG/ML
INJECTION, SOLUTION INTRAVENOUS PRN
Status: DISCONTINUED | OUTPATIENT
Start: 2022-08-02 | End: 2022-08-02 | Stop reason: HOSPADM

## 2022-08-02 RX ORDER — GABAPENTIN 300 MG/1
300 CAPSULE ORAL ONCE
Status: DISCONTINUED | OUTPATIENT
Start: 2022-08-02 | End: 2022-08-02

## 2022-08-02 RX ORDER — SODIUM CHLORIDE 0.9 % (FLUSH) 0.9 %
5-40 SYRINGE (ML) INJECTION EVERY 12 HOURS SCHEDULED
Status: DISCONTINUED | OUTPATIENT
Start: 2022-08-02 | End: 2022-08-04 | Stop reason: HOSPADM

## 2022-08-02 RX ORDER — MESALAMINE 1.2 G/1
1.2 TABLET, DELAYED RELEASE ORAL
Status: DISCONTINUED | OUTPATIENT
Start: 2022-08-03 | End: 2022-08-04 | Stop reason: HOSPADM

## 2022-08-02 RX ORDER — TRANEXAMIC ACID 100 MG/ML
INJECTION, SOLUTION INTRAVENOUS PRN
Status: DISCONTINUED | OUTPATIENT
Start: 2022-08-02 | End: 2022-08-02 | Stop reason: SDUPTHER

## 2022-08-02 RX ADMIN — FENTANYL CITRATE 50 MCG: 50 INJECTION, SOLUTION INTRAMUSCULAR; INTRAVENOUS at 11:37

## 2022-08-02 RX ADMIN — OXYCODONE HYDROCHLORIDE 10 MG: 5 TABLET ORAL at 20:43

## 2022-08-02 RX ADMIN — DEXAMETHASONE SODIUM PHOSPHATE 10 MG: 10 INJECTION, SOLUTION INTRAMUSCULAR; INTRAVENOUS at 11:19

## 2022-08-02 RX ADMIN — PROPOFOL 50 MG: 10 INJECTION, EMULSION INTRAVENOUS at 12:49

## 2022-08-02 RX ADMIN — MIDAZOLAM 2 MG: 1 INJECTION INTRAMUSCULAR; INTRAVENOUS at 09:40

## 2022-08-02 RX ADMIN — BUPIVACAINE HYDROCHLORIDE 20 ML: 2.5 INJECTION, SOLUTION EPIDURAL; INFILTRATION; INTRACAUDAL; PERINEURAL at 09:38

## 2022-08-02 RX ADMIN — HYDROCODONE BITARTRATE AND ACETAMINOPHEN 1 TABLET: 5; 325 TABLET ORAL at 14:18

## 2022-08-02 RX ADMIN — LIDOCAINE HYDROCHLORIDE 100 MG: 20 INJECTION, SOLUTION EPIDURAL; INFILTRATION; INTRACAUDAL; PERINEURAL at 11:04

## 2022-08-02 RX ADMIN — Medication 10 MG: at 11:52

## 2022-08-02 RX ADMIN — OXYCODONE HYDROCHLORIDE 10 MG: 5 TABLET ORAL at 16:27

## 2022-08-02 RX ADMIN — ACETAMINOPHEN 650 MG: 325 TABLET, FILM COATED ORAL at 16:27

## 2022-08-02 RX ADMIN — TRANEXAMIC ACID 1000 MG: 1 INJECTION, SOLUTION INTRAVENOUS at 12:37

## 2022-08-02 RX ADMIN — CEFAZOLIN SODIUM 3000 MG: 1 INJECTION, POWDER, FOR SOLUTION INTRAMUSCULAR; INTRAVENOUS at 11:14

## 2022-08-02 RX ADMIN — HYDRALAZINE HYDROCHLORIDE 2.5 MG: 20 INJECTION INTRAMUSCULAR; INTRAVENOUS at 11:58

## 2022-08-02 RX ADMIN — FENTANYL CITRATE 50 MCG: 50 INJECTION, SOLUTION INTRAMUSCULAR; INTRAVENOUS at 12:57

## 2022-08-02 RX ADMIN — PROPOFOL 150 MG: 10 INJECTION, EMULSION INTRAVENOUS at 11:06

## 2022-08-02 RX ADMIN — ONDANSETRON 4 MG: 2 INJECTION INTRAMUSCULAR; INTRAVENOUS at 11:19

## 2022-08-02 RX ADMIN — CEFAZOLIN 3000 MG: 10 INJECTION, POWDER, FOR SOLUTION INTRAVENOUS at 18:24

## 2022-08-02 RX ADMIN — FENTANYL CITRATE 50 MCG: 50 INJECTION, SOLUTION INTRAMUSCULAR; INTRAVENOUS at 11:24

## 2022-08-02 RX ADMIN — TRANEXAMIC ACID 1000 MG: 1 INJECTION, SOLUTION INTRAVENOUS at 11:15

## 2022-08-02 RX ADMIN — Medication 25 MG: at 11:37

## 2022-08-02 RX ADMIN — FENTANYL CITRATE 50 MCG: 50 INJECTION, SOLUTION INTRAMUSCULAR; INTRAVENOUS at 11:30

## 2022-08-02 RX ADMIN — APIXABAN 2.5 MG: 2.5 TABLET, FILM COATED ORAL at 20:43

## 2022-08-02 RX ADMIN — FENTANYL CITRATE 100 MCG: 50 INJECTION, SOLUTION INTRAMUSCULAR; INTRAVENOUS at 11:04

## 2022-08-02 RX ADMIN — ONDANSETRON 4 MG: 2 INJECTION INTRAMUSCULAR; INTRAVENOUS at 13:29

## 2022-08-02 RX ADMIN — FENTANYL CITRATE 25 MCG: 50 INJECTION, SOLUTION INTRAMUSCULAR; INTRAVENOUS at 13:07

## 2022-08-02 RX ADMIN — SODIUM CHLORIDE, POTASSIUM CHLORIDE, SODIUM LACTATE AND CALCIUM CHLORIDE: 600; 310; 30; 20 INJECTION, SOLUTION INTRAVENOUS at 12:43

## 2022-08-02 RX ADMIN — Medication 0.25 MG: at 13:36

## 2022-08-02 RX ADMIN — SODIUM CHLORIDE, POTASSIUM CHLORIDE, SODIUM LACTATE AND CALCIUM CHLORIDE: 600; 310; 30; 20 INJECTION, SOLUTION INTRAVENOUS at 09:23

## 2022-08-02 RX ADMIN — MIDAZOLAM 2 MG: 1 INJECTION INTRAMUSCULAR; INTRAVENOUS at 11:00

## 2022-08-02 RX ADMIN — Medication 10 MG: at 11:11

## 2022-08-02 RX ADMIN — FENTANYL CITRATE 25 MCG: 50 INJECTION, SOLUTION INTRAMUSCULAR; INTRAVENOUS at 13:02

## 2022-08-02 RX ADMIN — BUPIVACAINE 10 ML: 13.3 INJECTION, SUSPENSION, LIPOSOMAL INFILTRATION at 09:38

## 2022-08-02 ASSESSMENT — PAIN - FUNCTIONAL ASSESSMENT
PAIN_FUNCTIONAL_ASSESSMENT: PREVENTS OR INTERFERES SOME ACTIVE ACTIVITIES AND ADLS
PAIN_FUNCTIONAL_ASSESSMENT: 0-10

## 2022-08-02 ASSESSMENT — PAIN SCALES - GENERAL
PAINLEVEL_OUTOF10: 9
PAINLEVEL_OUTOF10: 8
PAINLEVEL_OUTOF10: 7
PAINLEVEL_OUTOF10: 4
PAINLEVEL_OUTOF10: 9
PAINLEVEL_OUTOF10: 6
PAINLEVEL_OUTOF10: 8

## 2022-08-02 ASSESSMENT — PAIN DESCRIPTION - LOCATION
LOCATION: KNEE

## 2022-08-02 ASSESSMENT — ENCOUNTER SYMPTOMS
CHEST TIGHTNESS: 0
VOMITING: 0
SHORTNESS OF BREATH: 0
COUGH: 0
COLOR CHANGE: 0
DIARRHEA: 0
NAUSEA: 0
CONSTIPATION: 0

## 2022-08-02 ASSESSMENT — PAIN DESCRIPTION - ORIENTATION
ORIENTATION: LEFT

## 2022-08-02 ASSESSMENT — PAIN DESCRIPTION - DESCRIPTORS
DESCRIPTORS: BURNING
DESCRIPTORS: ACHING
DESCRIPTORS: BURNING

## 2022-08-02 ASSESSMENT — PAIN DESCRIPTION - PAIN TYPE
TYPE: SURGICAL PAIN

## 2022-08-02 NOTE — CONSULTS
[facility]  Office: [address], DO, Tono Ramos DO, Casey Venegas, DO, Nyasia Hayden, DO, Lola Newman MD, Nicolette Rojas MD, Brent Torres MD, Jamie Luu MD,  Ramírez Rader MD, Chirag Cnaas MD, Divya Hudson, DO, Tio Bee MD,  Magdalena Murillo MD, Savannah Lane MD, Candice Malloy DO, Sanjuana Knight MD, Junito Zhong MD, Christian Nathan MD, La Felix DO, Cheo Moreno MD, Hira Maurer MD, Kathy Martell, CNP,  Eliane Trimble CNP, Dolores Chen CNP, Renee Nascimento CNP, Garcia Thomas PA-C, Jadyn Poe, DNP, Ja Hernandez, CNP, Marianne Morales, CNP, Laura Weber, CNP, Karen Carlin, CNP, Belton Severs, CNP, Bri Jeronimo, CNS, Gina Argueta, [facility], Nathaniel Medic, CNP, Chiquitafrederick Ewing, CNP, Александр Beverly, CNP           [facility]   [address] / HISTORY AND PHYSICAL EXAMINATION            Date:   8/2/2022  Patient name:  Lyly Dimas  Date of admission:  8/2/2022  8:27 AM  MRN:   [id number]  Account:  [de-identified]  YOB: 1950  PCP:    Ale Kelley MD  Room:   2006/2006-02  Code Status:    Full Code    Physician Requesting Consult: Melany Hager DO    Reason for Consult: General medical management    Chief Complaint:     Left knee arthritis    History Obtained From:     patient, electronic medical record    History of Present Illness:     Patient [facility] to [facility] today for scheduled left total knee arthroplasty with patella resurfacing with Dr. Samantha Dash. Patient has been experiencing progressively worsening left knee pain for \"quite a long time\". 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   Patient states that he had undergone injections which were helpful, but ultimately they stopped helping and surgery was advised. Patient has a past medical history of hypertension (not on any medication at this time), GERD and anxiety. Past Medical History:     Past Medical History:   Diagnosis Date    Cancer ([facility] [address]     Hypertension     Kidney stones     Neck complaint     crack at C6, herniated C5,C6,C7, Bulged at L5,S1    Skin cancer     UC (ulcerative colitis) ([facility] [address]         Past Surgical History:     Past Surgical History:   Procedure Laterality Date    ANKLE SURGERY Right     CATARACT REMOVAL WITH IMPLANT Bilateral     left with implant    CHOLECYSTECTOMY      COLONOSCOPY      ENDOSCOPY, COLON, DIAGNOSTIC      EYE SURGERY      HIP ARTHROPLASTY      JOINT REPLACEMENT      KNEE ARTHROPLASTY Left 08/02/2022    LEFT KNEE TOTAL ARTHROPLASTY    PATELLA RESURFACING    POSSIBLE PRESS FIT      - Lukasz Phlegm (Left: Knee    SKIN BIOPSY      TONSILLECTOMY      TOTAL KNEE ARTHROPLASTY Left 8/2/2022    LEFT KNEE TOTAL ARTHROPLASTY    PATELLA RESURFACING    POSSIBLE PRESS FIT      - Lukasz Phlegm performed by Augustine Edwards DO at [address] [place] [address]        Medications Prior to Admission:     Prior to Admission medications    Medication Sig Start Date End Date Taking? Authorizing Provider   docusate sodium (COLACE) 100 MG capsule Take 1 capsule by mouth 2 times daily as needed for Constipation 8/2/22  Yes Ludmila Russ DO   apixaban (ELIQUIS) 2.5 MG TABS tablet Take 1 tablet by mouth in the morning and 1 tablet before bedtime. Do all this for 14 days. 8/2/22 8/16/22 Yes Ludmila Russ DO   oxyCODONE-acetaminophen (PERCOCET) 5-325 MG per tablet Take 1 tablet by mouth every 6 hours as needed for Pain for up to 7 days. Intended supply: 7 days. Take lowest dose possible to manage pain 8/2/22 8/9/22 Yes Ludmila Russ DO   vitamin D (CHOLECALCIFEROL) 125 MCG (5000 UT) CAPS capsule Take 5,000 Units by mouth in the morning.  3/17/22 6/10/23  Historical Provider, MD   diclofenac (VOLTAREN) 50 MG EC tablet Take 50 mg by mouth in the morning and 50 mg before bedtime. 22   Historical Provider, MD   fluticasone Orrie Rad) 50 MCG/ACT nasal spray  20   Historical Provider, MD   HYDROcodone-acetaminophen (NORCO) 5-325 MG per tablet Take 1 tablet by mouth as needed for Pain. 22   Historical Provider, MD   zolpidem (AMBIEN) 10 MG tablet Take 10 mg by mouth nightly as needed. 22   Historical Provider, MD   mesalamine (LIALDA) 1.2 g EC tablet Take 1,200 mg by mouth daily (with breakfast) 5/3/22   Historical Provider, MD        Allergies:     Adhesive tape    Social History:     Tobacco:    reports that he has never smoked. He has never used smokeless tobacco.  Alcohol:      reports current alcohol use. Drug Use:  reports no history of drug use. Family History:     History reviewed. No pertinent family history. Review of Systems:     Positive and Negative as described in HPI. Review of Systems   Constitutional:  Negative for activity change, chills, fatigue and fever. HENT:  Negative for congestion. Respiratory:  Negative for cough, chest tightness and shortness of breath. Cardiovascular: Negative. Gastrointestinal:  Negative for constipation, diarrhea, nausea and vomiting. Endocrine: Negative for cold intolerance and polyuria. Genitourinary:  Negative for difficulty urinating. Musculoskeletal:  Positive for arthralgias and gait problem. Negative for myalgias. Skin:  Negative for color change and wound. Neurological:  Negative for dizziness, weakness and numbness. Psychiatric/Behavioral:  Negative for confusion. Physical Exam:     BP (!) 155/75   Pulse 94   Temp 97.3 °F (36.3 °C) (Axillary)   Resp 17   Ht 6' (1.829 m)   Wt 275 lb (124.7 kg)   SpO2 96%   BMI 37.30 kg/m²   Temp (24hrs), Av.2 °F (36.2 °C), Min:96.8 °F (36 °C), Max:97.5 °F (36.4 °C)    No results for input(s): POCGLU in the last 72 hours.     Intake/Output Summary (Last 24 hours) at 2022 192  Last data filed at 2022 9771  Gross per 24 hour   Intake 1035.06 ml   Output 275 ml   Net 760.06 ml       Physical Exam  Vitals and nursing note reviewed. Constitutional:       General: He is not in acute distress. Appearance: Normal appearance. HENT:      Head: Normocephalic. Mouth/Throat:      Mouth: Mucous membranes are moist.   Eyes:      Pupils: Pupils are equal, round, and reactive to light. Cardiovascular:      Rate and Rhythm: Normal rate and regular rhythm. Pulses: Normal pulses. Heart sounds: Normal heart sounds. No murmur heard. No friction rub. No gallop. Pulmonary:      Effort: Pulmonary effort is normal. No respiratory distress. Breath sounds: Normal breath sounds. No wheezing or rales. Abdominal:      General: Bowel sounds are normal. There is no distension. Palpations: Abdomen is soft. Tenderness: There is no abdominal tenderness. Musculoskeletal:         General: Tenderness present. Normal range of motion. Cervical back: Normal range of motion. Skin:     General: Skin is warm and dry. Capillary Refill: Capillary refill takes less than 2 seconds. Neurological:      General: No focal deficit present. Mental Status: He is alert and oriented to person, place, and time. Psychiatric:         Mood and Affect: Mood normal.         Behavior: Behavior normal.         Thought Content:  Thought content normal.         Judgment: Judgment normal.       Investigations:      Laboratory Testing:  Recent Results (from the past 24 hour(s))   CBC    Collection Time: 08/02/22  9:20 AM   Result Value Ref Range    WBC 7.3 3.5 - 11.3 k/uL    RBC 5.00 4.21 - 5.77 m/uL    Hemoglobin 15.2 13.0 - 17.0 g/dL    Hematocrit 47.3 40.7 - 50.3 %    MCV 94.6 82.6 - 102.9 fL    MCH 30.4 25.2 - 33.5 pg    MCHC 32.1 28.4 - 34.8 g/dL    RDW 12.3 11.8 - 14.4 %    Platelets 472 301 - 485 k/uL    MPV 9.4 8.1 - 13.5 fL    NRBC Automated 0.0 0.0 per 100 WBC   Hemoglobin and Hematocrit, Blood    Collection Time: 08/02/22  3:29 PM   Result Value Ref Range    Hemoglobin 14.4 13.0 - 17.0 g/dL    Hematocrit 44.1 40.7 - 50.3 %       Imaging/Diagonstics:  XR KNEE LEFT (3 VIEWS)    Result Date: 8/2/2022  Expected immediate postoperative findings status post knee arthroplasty. Assessment :      Hospital Problems             Last Modified POA    * (Principal) S/P total knee arthroplasty, left 8/2/2022 Yes    Arthritis of left knee 8/2/2022 Yes       Plan:     Status post left total knee arthroplasty  Anticoagulation per surgery  Antibiotics per surgery  As needed pain medication per surgery  Weightbearing and activity restrictions per surgery  Adult diet  PT/OT  Monitor CBC in a.m. Consultations:   IP CONSULT TO HOSPITALIST    On this date 8/2/2022 I have spent 26 minutes reviewing previous notes, test results and face to face with the patient discussing the diagnosis and importance of compliance with the treatment plan as well as documenting on the day of the visit. At least 50% of the time documented was spent with the patient to provide counseling and/or coordination of care.     HECTOR Carlton - CNP  8/2/2022  7:29 PM    Copy sent to Dr. Ale Kelley MD

## 2022-08-02 NOTE — ANESTHESIA PRE PROCEDURE
Department of Anesthesiology  Preprocedure Note       Name:  Daniela Victoria   Age:  67 y.o.  :  1950                                          MRN:  [id number]         Date:  2022      Surgeon: Brittney Castelan):  Piyush Bowden DO    Procedure: Procedure(s):  LEFT KNEE TOTAL ARTHROPLASTY    PATELLA RESURFACING    POSSIBLE PRESS FIT      - Tatum Spencer    Medications prior to admission:   Prior to Admission medications    Medication Sig Start Date End Date Taking? Authorizing Provider   vitamin D (CHOLECALCIFEROL) 125 MCG (5000 UT) CAPS capsule Take 5,000 Units by mouth 3/17/22 6/10/23  Historical Provider, MD   diclofenac (VOLTAREN) 50 MG EC tablet  22   Historical Provider, MD   fluticasone Tammy Bellis) 50 MCG/ACT nasal spray  20   Historical Provider, MD   HYDROcodone-acetaminophen ([facility]) 5-325 MG per tablet  22   Historical Provider, MD   zolpidem (AMBIEN) 10 MG tablet  22   Historical Provider, MD   mesalamine (LIALDA) 1.2 g EC tablet  5/3/22   Historical Provider, MD       Current medications:    No current facility-administered medications for this encounter. Allergies: Allergies   Allergen Reactions    Adhesive Tape        Problem List:  There is no problem list on file for this patient.       Past Medical History:        Diagnosis Date    Cancer ([facility] [address]     Hypertension     Kidney stones     Neck complaint     crack at C6, herniated C5,C6,C7, Bulged at L5,S1    Skin cancer     UC (ulcerative colitis) (H[facility])        Past Surgical History:        Procedure Laterality Date    ANKLE SURGERY Right     CATARACT REMOVAL WITH IMPLANT Bilateral     left with implant    CHOLECYSTECTOMY      COLONOSCOPY      ENDOSCOPY, COLON, DIAGNOSTIC      EYE SURGERY      HIP ARTHROPLASTY      JOINT REPLACEMENT      SKIN BIOPSY      TONSILLECTOMY         Social History:    Social History     Tobacco Use    Smoking status: Never    Smokeless tobacco: Never   Substance Use Topics    Alcohol use: Yes     Comment: rarely/ 2 times a year                                Counseling given: Not Answered      Vital Signs (Current):   Vitals:    08/02/22 0851 08/02/22 0853   BP:  (!) 150/103   Pulse:  91   Resp:  18   SpO2:  93%   Weight: 275 lb (124.7 kg)    Height: 6' (1.829 m)                                               BP Readings from Last 3 Encounters:   08/02/22 (!) 150/103   07/21/22 (!) 147/90       NPO Status:                                                                                 BMI:   Wt Readings from Last 3 Encounters:   08/02/22 275 lb (124.7 kg)   07/21/22 275 lb (124.7 kg)   06/01/22 (!) 305 lb (138.3 kg)     Body mass index is 37.3 kg/m². CBC:   Lab Results   Component Value Date/Time    WBC 7.0 07/21/2022 11:22 AM    RBC 4.97 07/21/2022 11:22 AM    HGB 15.3 07/21/2022 11:22 AM    HCT 47.5 07/21/2022 11:22 AM    MCV 95.6 07/21/2022 11:22 AM    RDW 12.0 07/21/2022 11:22 AM     07/21/2022 11:22 AM       CMP:   Lab Results   Component Value Date/Time     07/21/2022 11:22 AM    K 4.7 07/21/2022 11:22 AM     07/21/2022 11:22 AM    CO2 27 07/21/2022 11:22 AM    BUN 22 07/21/2022 11:22 AM    CREATININE 1.36 07/21/2022 11:22 AM    GFRAA >60 07/21/2022 11:22 AM    LABGLOM 51 07/21/2022 11:22 AM    GLUCOSE 92 07/21/2022 11:22 AM    PROT 6.9 07/21/2022 11:22 AM    CALCIUM 9.4 07/21/2022 11:22 AM    BILITOT 0.48 07/21/2022 11:22 AM    ALKPHOS 88 07/21/2022 11:22 AM    AST 27 07/21/2022 11:22 AM    ALT 25 07/21/2022 11:22 AM       POC Tests: No results for input(s): POCGLU, POCNA, POCK, POCCL, POCBUN, POCHEMO, POCHCT in the last 72 hours.     Coags: No results found for: PROTIME, INR, APTT    HCG (If Applicable): No results found for: PREGTESTUR, PREGSERUM, HCG, HCGQUANT     ABGs: No results found for: PHART, PO2ART, WGX9CPG, MXJ8EIT, BEART, J4HYEYUC     Type & Screen (If Applicable):  No results found for: LABABO, LABRH    Drug/Infectious Status (If Applicable):  No results found for: HIV, HEPCAB    COVID-19 Screening (If Applicable): No results found for: COVID19        Anesthesia Evaluation   no history of anesthetic complications:   Airway: Mallampati: II  TM distance: >3 FB   Neck ROM: full  Mouth opening: > = 3 FB   Dental:          Pulmonary:Negative Pulmonary ROS and normal exam                               Cardiovascular:  Exercise tolerance: good (>4 METS),   (+) hypertension:,     (-)  angina             ROS comment: 6/15/22 Sinus rhythm with 1st degree A-V block  Low voltage QRS  Inferior infarct , age undetermined  Cannot rule out Anterior infarct , age undetermined  Abnormal ECG  No previous ECGs available     Neuro/Psych:   (+) neuromuscular disease:, depression/anxiety             GI/Hepatic/Renal:   (+) renal disease: CRI, morbid obesity     (-) GERD       Endo/Other:    (+) : arthritis:., malignancy/cancer (skin). Abdominal:             Vascular: Other Findings:           Anesthesia Plan      general and regional     ASA 3       Induction: intravenous. MIPS: Postoperative opioids intended and Prophylactic antiemetics administered. Anesthetic plan and risks discussed with patient. Plan discussed with CRNA.     Attending anesthesiologist reviewed and agrees with Zoey Guidry MD   8/2/2022

## 2022-08-02 NOTE — PROGRESS NOTES
Occupational Therapy  Facility/Department: [facility] MED SURG  Occupational Therapy Initial Assessment    Name: Sunnie Goltz  : 1950  MRN: [id number]  Date of Service: 2022    LEROY Delacruz reports patient is medically stable for therapy treatment this date. Chart reviewed prior to treatment and patient is agreeable for therapy. All lines intact and patient positioned comfortably at end of treatment. All patient needs addressed prior to ending therapy session. Discharge Recommendations:  Patient would benefit from continued therapy after discharge  OT Equipment Recommendations  Equipment Needed: No  CTA in AM      S/P L TKA   Patient Diagnosis(es): The encounter diagnosis was Post-op pain. Past Medical History:  has a past medical history of Cancer ([facility] [address] Hypertension, Kidney stones, Neck complaint, Skin cancer, and UC (ulcerative colitis) ([facility] [address] Past Surgical History:  has a past surgical history that includes Endoscopy, colon, diagnostic; Colonoscopy; Cholecystectomy; skin biopsy; Tonsillectomy; joint replacement; Hip Arthroplasty; eye surgery; Cataract removal with implant (Bilateral); Ankle surgery (Right); Knee Arthroplasty (Left, 2022); and Total knee arthroplasty (Left, 2022). Assessment   Performance deficits / Impairments: Decreased functional mobility ; Decreased ADL status; Decreased safe awareness;Decreased cognition;Decreased endurance;Decreased balance;Decreased high-level IADLs;Decreased posture  Assessment: Pt S/P L TKA. Upon standing at RW with Mod Ax2 pt's L LE exhibited buckling d/t nerve block and it was unsafe to attempt functional mobility at this time. Will continue to assess pt in AM. Pt would benefit from skilled OT to return to PLOF.   Prognosis: Good  Decision Making: Medium Complexity  REQUIRES OT FOLLOW-UP: Yes  Activity Tolerance  Activity Tolerance: Patient limited by pain  Activity Tolerance Comments: Functional mob/standing limited by buckling of L LE d/t poor control of quad muscles from nerve block. Plan   Plan  Times per Week: 5-6x/week; 1-2x/day as chris  Current Treatment Recommendations: Strengthening, ROM, Balance training, Functional mobility training, Endurance training, Pain management, Safety education & training, Patient/Caregiver education & training, Equipment evaluation, education, & procurement, Positioning, Self-Care / ADL, Home management training, Cognitive/Perceptual training     Restrictions  Restrictions/Precautions  Restrictions/Precautions: General Precautions, Fall Risk, Surgical Protocols  Position Activity Restriction  Other position/activity restrictions: WBAT LLE, s/p L TKA with patella resurfacing, RUE IV, Dangle at edge of bed, progress to stand, and take a few steps with assistive device. 2)  Encourage ankle pumps and quad sets. 3)  Up in bedside chair as tolerated. 4)  Commode privileges with assistance.     Subjective   General  Chart Reviewed: Yes  Patient assessed for rehabilitation services?: Yes  Family / Caregiver Present: Yes (Spouse)  Subjective  Subjective: Pt reports 9/10 L knee pain while at rest in bed; Pt agreeable to participate in therapy, Unsure if he wants to go home or to [facility]-wants to see how he is doing in the AM.     Social/Functional History  Social/Functional History  Lives With: Spouse  Type of Home: House  Home Layout: Multi-level, Bed/Bath upstairs (8 steps upstairs to bedroom/bathroom)  Home Access: Stairs to enter without rails  Entrance Stairs - Number of Steps: 1+1  Entrance Stairs - Rails: None  Bathroom Shower/Tub: Walk-in shower (with theshold)  Bathroom Toilet: Standard  Bathroom Equipment: Grab bars in shower, Tub transfer bench, Grab bars around toilet  Has the patient had two or more falls in the past year or any fall with injury in the past year?: No  ADL Assistance: Independent (spouse helped pt get socks on)  Homemaking Responsibilities: No (spouse cooks, cleans, and does assistance;2 Person assistance  Transfer Comments: Pt Mod Ax2 for STS from EOB at RW level. Educated pt to push through B UEs on surface seated on and to utilize R LE to bear weight. When instructing pt on weight-shifting to the left the L LE was not stable and exhibited buckling. Returned pt to sitting and was not able to progress to functional mobility at this time, will reassess in AM.  Vision  Vision: Impaired  Vision Exceptions: Wears glasses for reading (cataract surgery in L eye)  Hearing  Hearing: Within functional limits  Cognition  Overall Cognitive Status: Exceptions  Arousal/Alertness: Appropriate responses to stimuli  Following Commands: Follows one step commands consistently; Follows one step commands with repetition  Attention Span: Appears intact  Memory: Appears intact  Safety Judgement: Decreased awareness of need for assistance;Decreased awareness of need for safety  Problem Solving: Assistance required to generate solutions;Assistance required to implement solutions  Insights: Fully aware of deficits  Initiation: Requires cues for some  Sequencing: Requires cues for some  Orientation  Overall Orientation Status: Within Functional Limits  Perception  Overall Perceptual Status: [facility]               Education Given To: Patient; Family  Education Provided: Role of Therapy;Plan of Care;Home Exercise Program;Precautions; ADL Adaptive Strategies;Transfer Training;Energy Conservation;Equipment; Fall Prevention Strategies  Education Provided Comments: Educated pt on ANGELA hose wear/care, use and purchase information for EZ slide, and OT plan of care  Education Method: Demonstration;Verbal  Barriers to Learning: None  Education Outcome: Verbalized understanding;Demonstrated understanding;Continued education needed                                  AM-PAC Score        AM-PAC Inpatient Daily Activity Raw Score: 18 (08/02/22 1647)  AM-PAC Inpatient ADL T-Scale Score : 38.66 (08/02/22 1647)  ADL Inpatient CMS 0-100% Score: 46.65 (08/02/22 1647)  ADL Inpatient CMS G-Code Modifier : CK (08/02/22 1647)         Goals  Short Term Goals  Time Frame for Short term goals: By discharge, pt will  Short Term Goal 1: Demo CGA with bed mob tasks with use of rails as needed. Short Term Goal 2: Demo CGAx1 with ADL transfers and apporp AD/DME and good safety  Short Term Goal 3: Tolerate re-assessment of funcitonal mob to add goal to POC when appropriate  Short Term Goal 4: Demo LB ADLs to Min A with use of AD/AE as needed and good safety  Short Term Goal 5: Demo and verb good understanding of fall prevention, EC/WS, ANGELA hose wear/care, possible equip needs, pain management strategies, and discharge recommendations. Patient Goals   Patient goals : To be able to walk       Therapy Time   Individual Concurrent Group Co-treatment   Time In       1517(+10 mins for chart review)   Time Out       1554   Minutes       37+10=47    Total treatment minutes: 28    Co-treatment with PT warranted first time up day of surgery. Cotx due to potential risk of decreased sensation, muscle control and proprioception from spinal epidural and/or regional block. Decreased safety and independence requiring 2 skilled therapy professionals to address individual discipline's goals.           Theodora Kamara, OT

## 2022-08-02 NOTE — ANESTHESIA PROCEDURE NOTES
Peripheral Block    Patient location during procedure: pre-op  Reason for block: procedure for pain, post-op pain management and at surgeon's request  Start time: 8/2/2022 9:38 AM  End time: 8/2/2022 9:45 AM  Staffing  Performed: anesthesiologist   Anesthesiologist: Fifi Cunningham MD  Preanesthetic Checklist  Completed: patient identified, IV checked, site marked, risks and benefits discussed, surgical/procedural consents, equipment checked, pre-op evaluation, timeout performed, anesthesia consent given, oxygen available, monitors applied/VS acknowledged, fire risk safety assessment completed and verbalized and blood product R/B/A discussed and consented  Peripheral Block   Patient position: supine  Prep: ChloraPrep  Provider prep: sterile gloves and mask  Patient monitoring: cardiac monitor, continuous pulse ox, continuous capnometry, frequent blood pressure checks, oxygen, IV access and responsive to questions  Block type: Femoral  Adductor canal  Laterality: left  Injection technique: single-shot  Guidance: ultrasound guided  Local infiltration: lidocaine  Infiltration strength: 1 %  Local infiltration: lidocaine  Dose: 2 mL    Needle   Needle type: pencil-tip   Needle gauge: 20 G  Needle localization: ultrasound guidance  Assessment   Injection assessment: negative aspiration for heme, no paresthesia on injection, local visualized surrounding nerve on ultrasound and no intravascular symptoms  Paresthesia pain: none  Slow fractionated injection: yes  Real-time US image taken/store: yes  Outcomes: patient tolerated procedure well and uncomplicated    Additional Notes  Preprocedure ready time 0938  Medications Administered  bupivacaine (PF) 0.25 % - Perineural, Thigh Left   20 mL - 8/2/2022 9:38:00 AM  bupivacaine liposome 1.3 % - Perineural, Thigh Left   10 mL - 8/2/2022 9:38:00 AM

## 2022-08-02 NOTE — DISCHARGE INSTRUCTIONS
ANY ORTHOPEDIC QUESTIONS OR ANY OTHER CONCERNS YOU MAY CALL THE ORTHOPEDIC COORDINATOR:  Addie Mcelroy RN, BSN  [phone]  [email or web address]    DISCHARGE INSTRUCTIONS  Caring for yourself after joint replacement surgery (Total Hip and Total Knee Replacement)    Activity and Therapy  Receive physical therapy three times per week. (Pain medication one hour prior to therapy)   Perform PT exercises on own when not receiving home or outpatient PT. Ideally exercises should be at least two times a day. Increase level of activity and ambulation each day. Perform deep breathing exercises daily. Patient provides self-care when possible. Work on Range of motion for Total knee patients. No pillow under the knee for Total knee patients. Elevate the surgical leg when seated. Diet:  Increase oral intake of fruits, fiber and water to prevent constipation. Drink fluids frequently and take stool softeners to aid in bowel motility. Increase protein intake/reduce high-sugar intake to help promote healing and prevent infection. Incision Care:  Keep Aquacel or other dressing intact until seen and removed by surgeon, unless saturated, in which case, call surgeon and request instructions. If dressing falls off, call surgeon. [facility] hospital OUT[facility]IENT [facility]IC on in the am and off in the pm to reduce swelling. Ice affected area four times a day, for twenty minutes. Pain Medications and Anticoagulant  You have been place on an anticoagulant to prevent blood clots. Take this medication exactly as prescribed. Be alert for signs of bleeding. Take care not to injure yourself. You have been provided pain medicine to control your pain. Do not take more narcotics than prescribed. You may begin weaning from narcotics as your pain level improves by decreasing the amount or frequency of the narcotics. You may also take plain acetaminophen as an alternate to the narcotics. Never exceed the recommended dosage.    Ice, rest and elevating the surgical limb also help with pain control. When to call the Surgeon:  Increased redness, warmth, drainage, swelling or odor from incision site. Temperature above 101 degrees. Pain not controlled by prescribed medications. Calf tenderness, swelling, or redness. Shortness of breath or chest pain. If you cannot urinate and have been consuming liquids  Any incision or surgical-related concerns. Call surgeon with concerns PRIOR TO going to hospital.    Normal Conditions:  Swelling in the operative leg: this should reduce over time. Bruising behind the knee and around surgical area. Some post-operative pain. Constipation related to pain medications/decreased mobility. (Increase fiber & water intake.)   Slight warmth of operative leg. Fatigue and moderate pain after therapy. Numbness near the incision site. Nausea - take pain medications with food. Cut back on pain medication. NOTE: Remember to go to follow-up orthopedic appointment with surgeon      Discharge instructions video: [email or web address]    Keep it Clean - Post-Operative Home instructions    These instructions are to help you have the best possible recovery after your surgical procedure. [facility] is here to support you. If you have questions, call [phone] Monday through Friday from 7:30AM to 8:30PM to speak to a nurse. If you need to speak to someone outside of these hours, call your physician. Incision Dos and Donts  Do wash hands before and after dressing changes or when you have had any contact with your incision. Use hand  or antibacterial soap. Do keep your incision clean and dry. Its OK to wash the skin around your incision with mild soap and water. Do change your dressing as you were told. Do notify your doctor if the dressing becomes wet or dirty. Do use a clean washcloth every time when cleaning your incision. Do sleep on clean linens. Do keep pets away from incision site.   Dont sit in a bathtub, pool, or hot tub until your incision is fully closed and any drains are removed. Dont scrub, pick, scratch, or pull at your incision. Dont use oils, lotions, or creams on your incision unless your healthcare provider approves it. Follow-up  You will have one or more follow-up visits with your healthcare provider. These are needed to check how well youre healing. Your drain, stitches, or staples may also be removed during these visits. Do not miss your follow-up visit, even if you are feeling better. Call your healthcare provider right away if you have the following:  Fever of 100.4°F (38°C) or higher, or as advised by your healthcare provider. Chest pain or trouble breathing. Pain or tenderness in your leg(s). Increased pain, redness, swelling, bleeding, or foul-smelling drainage at the incision site. Incision changes, separates or is hot to the touch. Problems with the drain if you have one. Itchy, swollen skin; skin rash. Medicines, Diet, and Activity:   Refer to your discharge paperwork for further instructions    Orthopaedic Instructions:  -Weight bearing status: Weight bearing as tolerated with the left leg  -Do not remove Optifoam bandage (the large sealed \"Band-aid\"-like dressing) until your follow-up date in clinic. It is okay to shower with the Optifoam bandage. Should it fall off, replace with band-aids or gauze & tape until dry. It is still okay to shower if it falls off, but avoid baths and underwater submersion.  -Ice (20 minutes on and off 1 hour) and elevate above the level of the heart to reduce swelling and throbbing pain.  -Call the office or come to Emergency Room if signs of infection appear (hot, swollen, red, draining pus, fever)  -Take medications as prescribed.  -Wean off narcotics (percocet/norco) as soon as possible. Do not take tylenol if still taking narcotics.  -Follow up with Dr. Malou Page in his office 10-14 days after surgery.  Call [phone] to schedule/confirm.

## 2022-08-02 NOTE — PROGRESS NOTES
Orthopedic Coordinator Note    Patient s/p left total Knee replacement on 08/02/2022 WITH DR. Jack Hdez. The following appointments are currently scheduled:    Post-op with surgeon 08/18/2022 AT [facility]. Physical Therapy PatienT REQUESTED SNF      Wheeled walker order is not entered  Face to face documentation is N/A-PT WILL GET  WALKER AT DISCHARGE FROM SNF. PT WILL TAKE A 2.5MG ELIQUIS BID FOR 14 DAYS POST-OP FOR DVT PROPHYLAXIS.       Any questions please contact Lizet Crespo RN, BSN      Electronically signed by: Lizet Crespo RN on 8/2/2022 at 8:43 AM

## 2022-08-02 NOTE — H&P
Interval H&P Note    Pt Name: Maurisio Polo  MRN: [id number]  YOB: 1950  Date of evaluation: 8/2/2022      [x] I have reviewed in [facility] the family medicine progress note by Dr. Ramesh Garcia dated 7/13/2022 for an Interval History and Physical note. [x] I have examined  Maurisio Polo  There are no changes to the patient who is scheduled for LEFT KNEE TOTAL ARTHROPLASTY    PATELLA RESURFACING    POSSIBLE PRESS FIT - Amarilis Starling by Zain Prado DO for Arthritis of left knee [M17.12]. The patient denies new health changes, fever, chills, wheezing, cough, increased SOB, chest pain, open sores or wounds. Denies hx diabetes. Last Vitamin D 8/2/2022 and Diclofenac 8/2/2022. Vital signs: BP (!) 150/103   Pulse 91   Temp 97.3 °F (36.3 °C)   Resp 18   Ht 6' (1.829 m)   Wt 275 lb (124.7 kg)   SpO2 93%   BMI 37.30 kg/m²     Allergies:  Adhesive tape    Medications:    Prior to Admission medications    Medication Sig Start Date End Date Taking? Authorizing Provider   vitamin D (CHOLECALCIFEROL) 125 MCG (5000 UT) CAPS capsule Take 5,000 Units by mouth in the morning. 3/17/22 6/10/23  Historical Provider, MD   diclofenac (VOLTAREN) 50 MG EC tablet Take 50 mg by mouth in the morning and 50 mg before bedtime. 4/19/22   Historical Provider, MD   fluticasone [facility]) 50 MCG/ACT nasal spray  7/7/20   Historical Provider, MD   HYDROcodone-acetaminophen (NORCO) 5-325 MG per tablet Take 1 tablet by mouth as needed for Pain. 4/19/22   Historical Provider, MD   zolpidem (AMBIEN) 10 MG tablet Take 10 mg by mouth nightly as needed. 5/9/22   Historical Provider, MD   mesalamine (LIALDA) 1.2 g EC tablet Take 1,200 mg by mouth daily (with breakfast) 5/3/22   Historical Provider, MD         This is a 67 y.o. obese male who is pleasant, cooperative, alert and oriented x3, in no acute distress. Heart: Heart sounds are normal.  HR 91 regular rate and rhythm without murmur, gallop or rub.    Lungs: Normal respiratory effort with equal expansion, good air exchange, unlabored and clear to auscultation without wheezes or rales bilaterally   Abdomen: soft, rotund, nontender, nondistended with bowel sounds active. Extremities: Pedal pulses palpable. No pedal edema or calf tenderness. Labs:  Recent Labs     07/21/22  1122   HGB 15.3   HCT 47.5   WBC 7.0   MCV 95.6         K 4.7      CO2 27   BUN 22   CREATININE 1.36*   GLUCOSE 92   AST 27   ALT 25   LABALBU 3.8       No results for input(s): COVID19 in the last 720 hours. HECTOR Snyder CNP  Electronically signed 8/2/2022 at 9:19 AM     Antoni Hernandez MD - 07/13/2022 11:00 AM EDT  Formatting of this note is different from the original.  SUBJECTIVE:       Patient ID: Anjel Duff is a 67 y.o. male. Hypertension  This is a chronic problem. The current episode started more than 1 year ago. The problem has been waxing and waning since onset. The problem is uncontrolled. Pertinent negatives include no anxiety, blurred vision, chest pain, neck pain, orthopnea, peripheral edema, PND or shortness of breath. There are no associated agents to hypertension. Risk factors for coronary artery disease include obesity and male gender. Past treatments include beta blockers and diuretics. The current treatment provides significant improvement. There are no compliance problems. There is no history of angina, kidney disease, CAD/MI, CVA or heart failure. The following portions of the patient's history were reviewed and updated as appropriate: allergies, current medications, past family history, past medical history, past social history, past surgical history and problem list.    Current Outpatient Medications:    ALPRAZolam (XANAX) 0.5 mg tablet, Take 1 tablet (0.5 mg total) by mouth every 8 (eight) hours for 90 days. , Disp: 270 tablet, Rfl: 0   HYDROcodone-acetaminophen (NORCO) 5-325 mg per tablet, , Disp: , Rfl:    meloxicam (MOBIC) 15 mg tablet, , Disp: , Rfl:

## 2022-08-02 NOTE — ANESTHESIA POSTPROCEDURE EVALUATION
Department of Anesthesiology  Postprocedure Note    Patient: Keena Ramesh  MRN: [id number]  YOB: 1950  Date of evaluation: 8/2/2022      Procedure Summary     Date: 08/02/22 Room / Location: [address]    Anesthesia Start: 1058 Anesthesia Stop: 3060    Procedure: LEFT KNEE TOTAL ARTHROPLASTY    PATELLA RESURFACING    POSSIBLE PRESS FIT      - Lukasz Phlegm (Left: Knee) Diagnosis:       Arthritis of left knee      (Arthritis of left knee [M17.12])    Surgeons: Augustine Edwards DO Responsible Provider: Bar Gates MD    Anesthesia Type: general, regional ASA Status: 3          Anesthesia Type: No value filed.     Jhon Phase I:      Jhon Phase II:        Anesthesia Post Evaluation    Patient location during evaluation: PACU  Patient participation: complete - patient participated  Level of consciousness: awake  Airway patency: patent  Nausea & Vomiting: no nausea  Complications: no  Cardiovascular status: blood pressure returned to baseline  Respiratory status: acceptable  Hydration status: euvolemic  Comments: Multimodal analgesia pain management as indicated by procedure  Multimodal analgesia pain management approach

## 2022-08-02 NOTE — CARE COORDINATION
Social work: Writer informed by Nicky/orthopedic navigator patient is interested in [facility] at discharge. Referral sent and will check benefits. If accepted, needs precert. SNF list provided to patient in case snf is necessary. If denied for [facility], patient may opt to go home.

## 2022-08-02 NOTE — PROGRESS NOTES
Physical Therapy  Facility/Department: [facility] MED SURG  Physical Therapy Initial Assessment    Name: Samuel Cooley  : 1950  MRN: [id number]  Date of Service: 2022        Discharge Recommendations:  Patient would benefit from continued therapy after discharge   PT Equipment Recommendations  Equipment Needed: Yes  Mobility Devices: Chinchepe Feliz: Myles  Pt presented to surgery on 22 for L TKA with patella resurfacing. RN Michelle Diop reports patient is medically stable for therapy treatment this date. Chart reviewed prior to treatment and patient is agreeable for therapy. Patient Diagnosis(es): The encounter diagnosis was Post-op pain. Past Medical History:  has a past medical history of Cancer ([facility] [address] Hypertension, Kidney stones, Neck complaint, Skin cancer, and UC (ulcerative colitis) ([facility] [address] Past Surgical History:  has a past surgical history that includes Endoscopy, colon, diagnostic; Colonoscopy; Cholecystectomy; skin biopsy; Tonsillectomy; joint replacement; Hip Arthroplasty; eye surgery; Cataract removal with implant (Bilateral); Ankle surgery (Right); Knee Arthroplasty (Left, 2022); and Total knee arthroplasty (Left, 2022). Assessment   Body Structures, Functions, Activity Limitations Requiring Skilled Therapeutic Intervention: Decreased functional mobility ; Decreased ADL status; Decreased ROM; Decreased body mechanics; Decreased strength;Decreased safe awareness;Decreased endurance;Decreased balance; Increased pain;Decreased posture  Assessment: Pt tolerated PT eval fair. Pt presented with deficits in ROM, strength, bed mobility, transfers, balance, and gait. Pt is currently requiring skilled 2 person assist to safely complete therapeutic activities. Overall, pt was limited d/t weakness in LLE that caused the knee to buckle when putting weight through it. Pt unsafe move away from EOB to begin ambulation or stair training.  Pt is currently functioning below baseline and requires continued PT to restore ROM/strength L knees & LLE, &  maximize independence with functional mobility, balance, safety awareness & activity tolerance. Therapy Prognosis: Good  Decision Making: Medium Complexity  Exam: ROM, MMT, functional mobility, activity tolerance, balance, [address] [facility] [address] [address] AM-PAC 6 clicks basic mobility  Clinical Presentation: evolving  Requires PT Follow-Up: Yes  Activity Tolerance  Activity Tolerance: Patient limited by fatigue;Patient limited by pain     Plan   Plan  Plan weeks: 1-2 x/day, 6-7 d/week  Current Treatment Recommendations: Strengthening, ROM, Balance training, Functional mobility training, Transfer training, ADL/Self-care training, Endurance training, Gait training, Stair training, Neuromuscular re-education, Home exercise program, Safety education & training, Patient/Caregiver education & training, Equipment evaluation, education, & procurement, Therapeutic activities  Safety Devices  Type of Devices: Bed alarm in place, Call light within reach, Gait belt, Left in bed, Nurse notified, All fall risk precautions in place, Patient at risk for falls  Restraints  Restraints Initially in Place: Yes  Restraints: all 4 bedrails in place     Restrictions  Restrictions/Precautions  Restrictions/Precautions: General Precautions, Fall Risk, Surgical Protocols  Position Activity Restriction  Other position/activity restrictions: WBAT LLE, s/p L TKA with patella resurfacing, RUE IV, Dangle at edge of bed, progress to stand, and take a few steps with assistive device. 2)  Encourage ankle pumps and quad sets. 3)  Up in bedside chair as tolerated. 4)  Commode privileges with assistance.      Subjective   General  Chart Reviewed: Yes  Patient assessed for rehabilitation services?: Yes  Additional Pertinent Hx: OA  Response To Previous Treatment: Not applicable  Family / Caregiver Present: Yes (spouse)  Follows Commands: Within Functional Limits  General Comment  Comments: RN and pt agreeable to therapy, pt resting supine in bed when writer entered  Subjective  Subjective: Pt c/o 9/10 pain at time of PT eval         Social/Functional History  Social/Functional History  Lives With: Spouse  Type of Home: House  Home Layout: Multi-level, Bed/Bath upstairs (8 steps upstairs to bedroom/bathroom)  Home Access: Stairs to enter without rails  Entrance Stairs - Number of Steps: 1+1  Entrance Stairs - Rails: None  Bathroom Shower/Tub: Walk-in shower (with theshold)  Bathroom Toilet: Standard  Bathroom Equipment: Grab bars in shower, Tub transfer bench, Grab bars around toilet  Has the patient had two or more falls in the past year or any fall with injury in the past year?: No  ADL Assistance: Independent (spouse helped pt get socks on)  Homemaking Responsibilities: No (spouse cooks, cleans, and does laundry)  Ambulation Assistance: Independent  Transfer Assistance: Independent  Active : Yes  Occupation: Retired  Leisure & Hobbies: currently a deacon and , spending time with wife  Vision/Hearing  Vision  Vision: Impaired  Vision Exceptions: Wears glasses for reading (cataract surgery in L eye)  Hearing  Hearing: Within functional limits    Cognition   Orientation  Overall Orientation Status: Within Functional Limits  Cognition  Overall Cognitive Status: Exceptions  Arousal/Alertness: Appropriate responses to stimuli  Following Commands: Follows one step commands consistently; Follows one step commands with repetition  Attention Span: Appears intact  Memory: Appears intact  Safety Judgement: Decreased awareness of need for assistance;Decreased awareness of need for safety  Problem Solving: Assistance required to generate solutions;Assistance required to implement solutions  Insights: Fully aware of deficits  Initiation: Requires cues for some  Sequencing: Requires cues for some     Objective    Observation/Palpation  Posture: Fair  Observation: 1.5 L O2 NC  Scar: new L TKA incision covered by intact post op dressing  Gross Assessment  Sensation: Intact (no numbness/tingling)     AROM RLE (degrees)  RLE AROM: WFL  PROM LLE (degrees)  LLE PROM: WFL  AROM LLE (degrees)  LLE General AROM: knee flexion 0-30 degrees  AROM RUE (degrees)  RUE AROM : WFL  AROM LUE (degrees)  LUE AROM : WFL  Strength RLE  Comment: grossly 4+/5  Strength LLE  Comment: not formally assessed d/t surgery this date        Bed mobility  Rolling to Right: Moderate assistance;2 Person assistance  Supine to Sit: Moderate assistance;2 Person assistance  Sit to Supine: Moderate assistance;2 Person assistance  Scooting: Moderate assistance;2 Person assistance  Bed Mobility Comments: Pt requiring increased time to complete bed mobility this date. Pt needed mod verbal cueing for initiation, sequencing, and progression of BLEs and trunk into sitting with fair return throughout. Pt also verbally cued for proper UE placement and pursed lip breathing with good return throughout. Upon sitting, pt denying dizziness and able to maintain upright posture with CGA. Transfers  Sit to Stand: 2 Person Assistance; Moderate Assistance  Stand to sit: Moderate Assistance;2 Person Assistance  Comment: Pt requiring mod verbal cueing for correct UE placement on RW, using nose over toes technique, and pursed lip breathing with fair return noted throughout. Pt required max verbal cueing to start with weight on RLE and gradually shifting to LLE to allow writer to observe for buckling, with good return. Upon weight shifting, pt's LLE observed to be buckling, so pt was transferred safely back to EOB.     Ambulation  Comments: not formally assessed, pt unsafe to ambulate d/t knee buckling     Balance  Posture: Fair  Sitting - Static: Good;-  Sitting - Dynamic: Fair;+  Standing - Static: Poor  Single Leg Stance R Le  Single Leg Stance L Le  Comments: standing balance assessed with RW  A/AROM Exercises: quad and glute sets, ankle pumps, heel slides OutComes Score                                                  AM-PAC Score  AM-PAC Inpatient Mobility Raw Score : 9 (08/02/22 1629)  AM-PAC Inpatient T-Scale Score : 30.55 (08/02/22 1629)  Mobility Inpatient CMS 0-100% Score: 81.38 (08/02/22 1629)  Mobility Inpatient CMS G-Code Modifier : CM (08/02/22 1629)       Goals  Short Term Goals  Time Frame for Short term goals: 12 visits  Short term goal 1: Inc bed mobility to [employer] term goal 2: Inc STS transfers to [address] with RW  Short term goal 3: Initiate and assess gait goals as appropriate  Short term goal 4: Pt to ascend/ descend 2 stairs without handrails Pranav  Short term goal 5: Pt to tolerate 30 minutes of daily PT for ther act, ther ex, balance, and gait  Patient Goals   Patient goals : return home safely       Education  Patient Education  Education Given To: Patient; Family  Education Provided: Role of Therapy;Plan of Care;Home Exercise Program;Precautions;Transfer Training;Energy Conservation; ADL Adaptive Strategies; Fall Prevention Strategies  Education Provided Comments: Pt educated on purpose of acute PT eval, general safety awareness, precautions for TKA including pillow placement, pursed lip breathing, safe transfers, and PT POC. Pt with good return of info presented. Education Method: Verbal  Barriers to Learning: None  Education Outcome: Verbalized understanding      Therapy Time   Individual Concurrent Group Co-treatment   Time In 1510         Time Out 1556         Minutes 46+10=56  total            Co-treatment with OT warranted secondary to decreased safety and independence requiring 2 skilled therapy professionals to address individual discipline's goals. PT addressing core control in transitions with bed mobility & transfers, seated/standing posture, pressure relief, seated & standing postural alignment and breathing techniques, safety/scanning, & fall prevention in ambulation techniques.       [address], PT

## 2022-08-02 NOTE — PROGRESS NOTES
Pt admitted to room 2006 per bed in painful condition from PACU  Oriented to room and surroundings  Bed in lowest position, wheels locked, 2/4 side rails up  Call light in reach, room free of clutter, adequate lighting provided  Denies any further questions at this time  Instructed to call out with any questions/concerns/new onset of pain and/or n/v   White board updated  Continue to monitor with hourly rounding  STAY WITH ME protocol initiated   Bed alarm on/Fall Risk signs in place/Fall risk sticker to wrist band  Non-skid socks on/at bedside

## 2022-08-02 NOTE — BRIEF OP NOTE
Brief Postoperative Note      Patient: Simone Pineda  YOB: 1950  MRN: [id number]    Date of Procedure: 8/2/2022    Pre-Op Diagnosis: Left knee osteoarthritis    Post-Op Diagnosis: Left knee osteoarthritis       Procedure(s): Left cementless total knee arthroplasty    Surgeon(s): Nathanael Meigs, DO    Assistant: Resident: Shanice Roman DO; Ethan Doherty DO    Anesthesia: General    Estimated Blood Loss (mL): 75 mL    Tourniquet time: None    Complications: None    Specimens: * No specimens in log *    Implants:  Implant Name Type Inv.  Item Serial No.  Lot No. LRB No. Used Action   PSN FEM CR POR CCR STD [id number] L - [id number]  PSN FEM CR POR CCR STD SZ11 L  [facility] [id number] Left 1 Implanted   PSN TIB POR 2 PEG SZ G L - [id number]  PSN TIB POR 2 PEG SZ G L  [employer]- [id number] Left 1 Implanted   PSN MC VE ASF L 10MM 8-[facility] - [id number]  PSN MC VE ASF L 10MM 8-11   [employer]- [id number] Left 1 Implanted         Drains: * No LDAs found *    Findings: Left knee osteoarthritis    Electronically signed by Ethan Doherty DO on 8/2/2022 at 1:07 PM

## 2022-08-02 NOTE — DISCHARGE INSTR - COC
Continuity of Care Form    Patient Name: Sunnie Goltz   :  1950  MRN:  [id number]    Admit date:  2022  Discharge date:  22    Code Status Order: No Order   Advance Directives:   [facility] Documentation       Date/Time Healthcare Directive Type of Healthcare Directive Copy in [address] Agent's Name Healthcare Agent's Phone Number    22 0915 Yes, patient has an advance directive for healthcare treatment -- -- -- -- --            Admitting Physician:  Leopold Midget, DO  PCP: Yasmin Chadwick MD    Discharging Nurse: [facility] Unit/Room#: [address] Phone Number: [phone]    Emergency Contact:   Extended Emergency Contact Information  Primary Emergency Contact: Elizabeth Bates  Address: [address], [address] [place] [address] Phone: [phone]  Relation: Spouse    Past Surgical History:  Past Surgical History:   Procedure Laterality Date    ANKLE SURGERY Right     CATARACT REMOVAL WITH IMPLANT Bilateral     left with implant    CHOLECYSTECTOMY      COLONOSCOPY      ENDOSCOPY, COLON, DIAGNOSTIC      EYE SURGERY      HIP ARTHROPLASTY      JOINT REPLACEMENT      SKIN BIOPSY      TONSILLECTOMY         Immunization History: There is no immunization history on file for this patient. Active Problems: There is no problem list on file for this patient.       Isolation/Infection:   Isolation            No Isolation          Patient Infection Status       None to display            Nurse Assessment:  Last Vital Signs: BP (!) 158/84   Pulse 80   Temp 97.3 °F (36.3 °C)   Resp 18   Ht 6' (1.829 m)   Wt 275 lb (124.7 kg)   SpO2 98%   BMI 37.30 kg/m²     Last documented pain score (0-10 scale): Pain Level: 1  Last Weight:   Wt Readings from Last 1 Encounters:   22 275 lb (124.7 kg)     Mental Status:  oriented, alert, coherent, logical, thought processes intact, and able to concentrate and follow conversation    IV Access:  - None    Nursing Mobility/ADLs:  Walking   Assisted  Transfer  Assisted  Bathing  Assisted  Dressing  Assisted  Toileting  Assisted  Feeding  Independent  [address]   whole    Wound Care Documentation and Therapy:        Elimination:  Continence: Bowel: Yes  Bladder: Yes  Urinary Catheter: None   Colostomy/Ileostomy/Ileal Conduit: No       Date of Last BM: 8/2/22  No intake or output data in the 24 hours ending 08/02/22 1014  No intake/output data recorded. Safety Concerns: At Risk for Falls    Impairments/Disabilities:      None    Nutrition Therapy:  Current Nutrition Therapy:   - Oral Diet:  General    Routes of Feeding: Oral  Liquids: No Restrictions  Daily Fluid Restriction: no  Last Modified Barium Swallow with Video (Video Swallowing Test): not done    Treatments at the Time of Hospital Discharge:   Respiratory Treatments: n/a  Oxygen Therapy:  is not on home oxygen therapy. Ventilator:    - No ventilator support    Rehab Therapies: Physical Therapy and Occupational Therapy  Weight Bearing Status/Restrictions: No weight bearing restrictions  Other Medical Equipment (for information only, NOT a DME order):  walker  Other Treatments: n/a    Patient's personal belongings (please select all that are sent with patient):  None    RN SIGNATURE:  Electronically signed by Km Chacon RN on 8/4/22 at 3:37 PM EDT    CASE MANAGEMENT/SOCIAL WORK SECTION    Inpatient Status Date: ***    Readmission Risk Assessment Score:  Readmission Risk              Risk of Unplanned Readmission:  0           Discharging to Facility/ Agency   Name: Danette Pettit  [address] [place] [address], [address]  Phone  [phone]  Fax  [phone]   Address:  Phone:  Fax:    Dialysis Facility (if applicable)   Name:  Address:  Dialysis Schedule:  Phone:  Fax:    / signature: Electronically signed by Dimitri Degroot RENETTA Zhang on 8/4/22 at 3:48 PM EDT    PHYSICIAN SECTION    Prognosis: Good    Condition at Discharge: Stable    Rehab Potential (if transferring to Rehab): Good    Recommended Labs or Other Treatments After Discharge: Keep dressings clean and dry left knee. Patient can be weightbearing as tolerated to the left lower extremity. PT and OT to evaluate and treat for range of motion restoration and edema control. Follow-up with Dr. Ezra Garvey in 2 weeks or sooner as necessary. Physician Certification: I certify the above information and transfer of Enriqueta Brown  is necessary for the continuing treatment of the diagnosis listed and that he requires [facility] for less 30 days.      Update Admission H&P: No change in H&P    PHYSICIAN SIGNATURE:  Electronically signed by Anastacio Tellez DO on 8/2/22 at 10:15 AM EDT

## 2022-08-03 PROBLEM — K51.90 ULCERATIVE COLITIS (HCC): Status: ACTIVE | Noted: 2022-08-03

## 2022-08-03 PROBLEM — I10 ESSENTIAL HYPERTENSION: Status: ACTIVE | Noted: 2022-08-03

## 2022-08-03 LAB
ABSOLUTE EOS #: <0.03 K/UL (ref 0–0.44)
ABSOLUTE IMMATURE GRANULOCYTE: 0.04 K/UL (ref 0–0.3)
ABSOLUTE LYMPH #: 0.71 K/UL (ref 1.1–3.7)
ABSOLUTE MONO #: 0.97 K/UL (ref 0.1–1.2)
BASOPHILS # BLD: 0 % (ref 0–2)
BASOPHILS ABSOLUTE: <0.03 K/UL (ref 0–0.2)
EOSINOPHILS RELATIVE PERCENT: 0 % (ref 1–4)
HCT VFR BLD CALC: 44.2 % (ref 40.7–50.3)
HEMOGLOBIN: 14 G/DL (ref 13–17)
IMMATURE GRANULOCYTES: 0 %
LYMPHOCYTES # BLD: 6 % (ref 24–43)
MCH RBC QN AUTO: 30.6 PG (ref 25.2–33.5)
MCHC RBC AUTO-ENTMCNC: 31.7 G/DL (ref 28.4–34.8)
MCV RBC AUTO: 96.5 FL (ref 82.6–102.9)
MONOCYTES # BLD: 8 % (ref 3–12)
NRBC AUTOMATED: 0 PER 100 WBC
PDW BLD-RTO: 12.5 % (ref 11.8–14.4)
PLATELET # BLD: 250 K/UL (ref 138–453)
PMV BLD AUTO: 9.6 FL (ref 8.1–13.5)
RBC # BLD: 4.58 M/UL (ref 4.21–5.77)
SEG NEUTROPHILS: 85 % (ref 36–65)
SEGMENTED NEUTROPHILS ABSOLUTE COUNT: 10.13 K/UL (ref 1.5–8.1)
WBC # BLD: 11.9 K/UL (ref 3.5–11.3)

## 2022-08-03 PROCEDURE — 6360000002 HC RX W HCPCS: Performed by: STUDENT IN AN ORGANIZED HEALTH CARE EDUCATION/TRAINING PROGRAM

## 2022-08-03 PROCEDURE — 97535 SELF CARE MNGMENT TRAINING: CPT

## 2022-08-03 PROCEDURE — 97530 THERAPEUTIC ACTIVITIES: CPT

## 2022-08-03 PROCEDURE — 36415 COLL VENOUS BLD VENIPUNCTURE: CPT

## 2022-08-03 PROCEDURE — 2580000003 HC RX 258: Performed by: STUDENT IN AN ORGANIZED HEALTH CARE EDUCATION/TRAINING PROGRAM

## 2022-08-03 PROCEDURE — 97116 GAIT TRAINING THERAPY: CPT

## 2022-08-03 PROCEDURE — 97110 THERAPEUTIC EXERCISES: CPT

## 2022-08-03 PROCEDURE — 6370000000 HC RX 637 (ALT 250 FOR IP): Performed by: STUDENT IN AN ORGANIZED HEALTH CARE EDUCATION/TRAINING PROGRAM

## 2022-08-03 PROCEDURE — 99225 PR SBSQ OBSERVATION CARE/DAY 25 MINUTES: CPT | Performed by: INTERNAL MEDICINE

## 2022-08-03 PROCEDURE — 85025 COMPLETE CBC W/AUTO DIFF WBC: CPT

## 2022-08-03 RX ADMIN — SODIUM CHLORIDE, PRESERVATIVE FREE 10 ML: 5 INJECTION INTRAVENOUS at 08:07

## 2022-08-03 RX ADMIN — ONDANSETRON 4 MG: 4 TABLET, ORALLY DISINTEGRATING ORAL at 00:49

## 2022-08-03 RX ADMIN — APIXABAN 2.5 MG: 2.5 TABLET, FILM COATED ORAL at 08:07

## 2022-08-03 RX ADMIN — ACETAMINOPHEN 650 MG: 325 TABLET, FILM COATED ORAL at 13:13

## 2022-08-03 RX ADMIN — OXYCODONE HYDROCHLORIDE 10 MG: 5 TABLET ORAL at 05:29

## 2022-08-03 RX ADMIN — ACETAMINOPHEN 650 MG: 325 TABLET, FILM COATED ORAL at 00:43

## 2022-08-03 RX ADMIN — OXYCODONE HYDROCHLORIDE 10 MG: 5 TABLET ORAL at 22:47

## 2022-08-03 RX ADMIN — OXYCODONE HYDROCHLORIDE 10 MG: 5 TABLET ORAL at 14:08

## 2022-08-03 RX ADMIN — OXYCODONE HYDROCHLORIDE 10 MG: 5 TABLET ORAL at 09:36

## 2022-08-03 RX ADMIN — ACETAMINOPHEN 650 MG: 325 TABLET, FILM COATED ORAL at 17:47

## 2022-08-03 RX ADMIN — ACETAMINOPHEN 650 MG: 325 TABLET, FILM COATED ORAL at 22:48

## 2022-08-03 RX ADMIN — ACETAMINOPHEN 650 MG: 325 TABLET, FILM COATED ORAL at 05:29

## 2022-08-03 RX ADMIN — OXYCODONE HYDROCHLORIDE 10 MG: 5 TABLET ORAL at 18:38

## 2022-08-03 RX ADMIN — CEFAZOLIN 3000 MG: 10 INJECTION, POWDER, FOR SOLUTION INTRAVENOUS at 03:27

## 2022-08-03 RX ADMIN — SODIUM CHLORIDE, PRESERVATIVE FREE 10 ML: 5 INJECTION INTRAVENOUS at 20:44

## 2022-08-03 RX ADMIN — ONDANSETRON 4 MG: 4 TABLET, ORALLY DISINTEGRATING ORAL at 17:47

## 2022-08-03 RX ADMIN — OXYCODONE HYDROCHLORIDE 10 MG: 5 TABLET ORAL at 00:43

## 2022-08-03 RX ADMIN — APIXABAN 2.5 MG: 2.5 TABLET, FILM COATED ORAL at 20:44

## 2022-08-03 RX ADMIN — ONDANSETRON 4 MG: 4 TABLET, ORALLY DISINTEGRATING ORAL at 09:43

## 2022-08-03 ASSESSMENT — PAIN DESCRIPTION - DESCRIPTORS
DESCRIPTORS: ACHING;DISCOMFORT
DESCRIPTORS: ACHING;BURNING
DESCRIPTORS: ACHING
DESCRIPTORS: ACHING
DESCRIPTORS: ACHING;DISCOMFORT;BURNING

## 2022-08-03 ASSESSMENT — PAIN DESCRIPTION - ORIENTATION
ORIENTATION: LEFT

## 2022-08-03 ASSESSMENT — PAIN DESCRIPTION - LOCATION
LOCATION: KNEE

## 2022-08-03 ASSESSMENT — PAIN - FUNCTIONAL ASSESSMENT
PAIN_FUNCTIONAL_ASSESSMENT: PREVENTS OR INTERFERES SOME ACTIVE ACTIVITIES AND ADLS

## 2022-08-03 ASSESSMENT — PAIN DESCRIPTION - ONSET
ONSET: ON-GOING

## 2022-08-03 ASSESSMENT — PAIN DESCRIPTION - PAIN TYPE
TYPE: SURGICAL PAIN

## 2022-08-03 ASSESSMENT — PAIN DESCRIPTION - FREQUENCY
FREQUENCY: CONTINUOUS

## 2022-08-03 ASSESSMENT — PAIN SCALES - GENERAL
PAINLEVEL_OUTOF10: 7
PAINLEVEL_OUTOF10: 8
PAINLEVEL_OUTOF10: 5
PAINLEVEL_OUTOF10: 8
PAINLEVEL_OUTOF10: 8
PAINLEVEL_OUTOF10: 7
PAINLEVEL_OUTOF10: 6
PAINLEVEL_OUTOF10: 5

## 2022-08-03 NOTE — PROGRESS NOTES
[facility]  Office: [address], DO, Mary Vázquez, DO, Fifi Frazier, DO, [facility] Blood, DO, Jesse Taylor MD, May Will MD, Ferdinand Ortiz MD, Ignacio Ruiz MD,  Pge Martinez MD, Natasha Melara MD, Paola Fowler, DO, Hugo Dueñas MD,  Renny Moreira MD, Ike Scruggs MD, Shalom Farrell, DO, Maryellen Drake MD, Geraldnie Snyder MD, Maricel Manjarrez MD, Emily George, DO, Natalie Gramajo MD, Shivani Albarran MD, Kayli Hooks, CNP,  Mirian Molina, CNP, Danilo Doshi, CNP, Jadyn Alegria, CNP, Nell Fisher PA-C, Santana Soriano, [facility], Ana Truong, CNP, Kimberly Olivo, CNP, Yanique Kelsey, CNP, Rosie Verduzco, CNP, Tal Phillips, CNP, Yamile Gold, CNS, Slim Gilliland, [facility], Sheila Nunn, CNP, Gloria Freeman, CNP, Mabel Lopez, CNP           [facility]    Progress Note    8/3/2022    1:01 PM    Name:   Cassaundra Snellen  MRN:     [id number]     Acct:      [de-identified]   Room:   2006/2006-02  IP Day:  0  Admit Date:  8/2/2022  8:27 AM    PCP:   Theodore Garcia MD  Code Status:  Full Code    Subjective:     C/C: Knee pain, admitted for arthroplasty    Interval History Status: improved. Patient is resting comfortably, postoperative pain reasonably well controlled. Denies any chest pain, shortness of breath, nausea vomiting, fever chills or acute complaints. Brief History: This is a 50-year-old male who is a retired  and  that presents with a complaint of left knee pain for arthroplasty. He has had longstanding history of worsening arthritic symptoms and has failed conservative measures. He presents at this time for total knee arthroplasty and we are asked to see him for postoperative medical management of hypertension and ulcerative colitis.     Review of Systems:     Constitutional:  negative for chills, fevers, sweats  Respiratory:  negative for cough, dyspnea on exertion, shortness of breath, wheezing  Cardiovascular:  negative for chest pain, chest pressure/discomfort, lower extremity edema, palpitations  Gastrointestinal:  negative for abdominal pain, constipation, diarrhea, nausea, vomiting  Neurological:  negative for dizziness, headache    Medications: Allergies: Allergies   Allergen Reactions    Adhesive Tape        Current Meds:   Scheduled Meds:    bupivacaine liposome  10 mL SubCUTAneous Once    vitamin D  5,000 Units Oral Daily    fluticasone  1 spray Each Nostril Daily    mesalamine  1.2 g Oral Daily with breakfast    sodium chloride flush  5-40 mL IntraVENous 2 times per day    acetaminophen  650 mg Oral 4 times per day    apixaban  2.5 mg Oral BID     Continuous Infusions:    sodium chloride      sodium chloride       PRN Meds: zolpidem, sodium chloride flush, sodium chloride, oxyCODONE **OR** oxyCODONE, ondansetron **OR** ondansetron    Data:     Past Medical History:   has a past medical history of Cancer ([facility] [address] Hypertension, Kidney stones, Neck complaint, Skin cancer, and UC (ulcerative colitis) ([facility] Social History:   reports that he has never smoked. He has never used smokeless tobacco. He reports current alcohol use. He reports that he does not use drugs. Family History: History reviewed. No pertinent family history. Vitals:  BP 97/68   Pulse 87   Temp 97.7 °F (36.5 °C) (Oral)   Resp 16   Ht 6' (1.829 m)   Wt (!) 312 lb (141.5 kg)   SpO2 93%   BMI 42.31 kg/m²   Temp (24hrs), Av.7 °F (36.5 °C), Min:96.8 °F (36 °C), Max:98.4 °F (36.9 °C)    No results for input(s): POCGLU in the last 72 hours. I/O (24Hr):     Intake/Output Summary (Last 24 hours) at 8/3/2022 1301  Last data filed at 8/3/2022 1041  Gross per 24 hour   Intake 395.06 ml   Output 425 ml   Net -29.94 ml       Labs:  Hematology:  Recent Labs     22  0920 22  1529 22  0600   WBC 7.3  --  11.9*   RBC 5.00  --  4.58   HGB 15.2 14.4 14.0   HCT 47.3 44.1 44.2 MCV 94.6  --  96.5   MCH 30.4  --  30.6   MCHC 32.1  --  31.7   RDW 12.3  --  12.5     --  250   MPV 9.4  --  9.6     Chemistry:No results for input(s): NA, K, CL, CO2, GLUCOSE, BUN, CREATININE, MG, ANIONGAP, LABGLOM, GFRAA, CALCIUM, CAION, PHOS, PSA, PROBNP, TROPHS, CKTOTAL, CKMB, CKMBINDEX, MYOGLOBIN, DIGOXIN, LACTACIDWB in the last 72 hours. No results for input(s): PROT, LABALBU, LABA1C, F2VPGKJ, D5ZWTBU, FT4, TSH, AST, ALT, LDH, GGT, ALKPHOS, LABGGT, BILITOT, BILIDIR, AMMONIA, AMYLASE, LIPASE, LACTATE, CHOL, HDL, LDLCHOLESTEROL, CHOLHDLRATIO, TRIG, VLDL, PPG91ZI, PHENYTOIN, PHENYF, URICACID, POCGLU in the last 72 hours. ABG:No results found for: POCPH, PHART, PH, POCPCO2, IAD7FGA, PCO2, POCPO2, PO2ART, PO2, POCHCO3, OVJ5BWY, HCO3, NBEA, PBEA, BEART, BE, THGBART, THB, OWT6JYE, ERVF9MHC, Y3JXZNHB, O2SAT, FIO2  No results found for: SPECIAL  No results found for: CULTURE    Radiology:  XR KNEE LEFT (3 VIEWS)    Result Date: 8/2/2022  Expected immediate postoperative findings status post knee arthroplasty.        Physical Examination:        General appearance:  alert, cooperative and no distress  Mental Status:  oriented to person, place and time and normal affect  Lungs:  clear to auscultation bilaterally, normal effort  Heart:  regular rate and rhythm, no murmur  Abdomen:  soft, nontender, nondistended, normal bowel sounds, no masses, hepatomegaly, splenomegaly  Extremities:  no edema, redness, tenderness in the calves  Skin:  no gross lesions, rashes, induration    Assessment:        Hospital Problems             Last Modified POA    * (Principal) S/P total knee arthroplasty, left 8/2/2022 Yes    Arthritis of left knee 8/2/2022 Yes    Post-op pain 8/2/2022 Yes    Essential hypertension 8/3/2022 Yes    Ulcerative colitis ([address] 8/3/2022 Yes       Plan:        Postop PT and OT per orthopedics  Continue efforts for pain control  DVT prophylaxis  Monitor and control blood pressure, continue home medications  Continue home mesalamine  Medically stable for discharge pending progress with therapy    Berna Rodriguez,   8/3/2022  1:01 PM

## 2022-08-03 NOTE — FLOWSHEET NOTE
[facility]  PROGRESS NOTE    Room # 2006/2006-02   Name: Saray Potter              Reason for visit: Routine    I visited the patient. Admit Date & Time: 8/2/2022  8:27 AM    Assessment:  Saray Potter is a 67 y.o. male. Upon entering the room patient states about their medical condition, states struggles with their medical situation. States worries, fears frustrations. Patient states well , treated well. Patient states good family support, shares about spiritual life, Hinduism background, shares Hinduism beliefs. Patient shares about outside interests. Intervention:   provided a ministry presence, listening and prayer. Outcome:  Patient open to visit. Plan:  Chaplains will remain available to offer spiritual and emotional support as needed. Electronically signed by [place] Chaplain Thao on 8/2/2022 at 8:47 PM.  Diana        08/02/22 2045   Encounter Summary   Service Provided For: Patient   [facility]   Support System Spouse   Last Encounter  08/02/22   Complexity of Encounter Moderate   Begin Time 0807   End Time  0830   Total Time Calculated 23 min   Encounter    Type Initial Screen/Assessment   Assessment/Intervention/Outcome   Assessment Calm   Intervention Discussed belief system/Hinduism practices/jose;Discussed illness injury and its impact;Prayer (assurance of)/Kouts;Sustaining Presence/Ministry of presence   Outcome Engaged in conversation;Expressed feelings of Jennifer, Peace and/or Love;Expressed Gratitude;Receptive

## 2022-08-03 NOTE — PROGRESS NOTES
would benefit from skilled OT to increase safety with functional tasks and return to PLOF.   Prognosis: Good  REQUIRES OT FOLLOW-UP: Yes        Plan   Plan  Times per Week: 5-6x/week; 1-2x/day as chris  Current Treatment Recommendations: Strengthening, ROM, Balance training, Functional mobility training, Endurance training, Pain management, Safety education & training, Patient/Caregiver education & training, Equipment evaluation, education, & procurement, Positioning, Self-Care / ADL, Home management training, Cognitive/Perceptual training     Restrictions  Restrictions/Precautions  Restrictions/Precautions: General Precautions, Fall Risk, Surgical Protocols, Up as Tolerated, Weight Bearing  Lower Extremity Weight Bearing Restrictions  Left Lower Extremity Weight Bearing: Weight Bearing As Tolerated  Position Activity Restriction  Other position/activity restrictions: WBAT LLE, s/p L TKA with patella resurfacing, RUE IV,    Subjective   General  Chart Reviewed: Yes  Patient assessed for rehabilitation services?: Yes  Family / Caregiver Present: Yes (Spouse)  Subjective  Subjective: Pt reports dizziness and 8/10 L knee pain while at rest in bed     Social/Functional History  Social/Functional History  Lives With: Spouse  Type of Home: House  Home Layout: Multi-level, Bed/Bath upstairs (8 steps upstairs to bedroom/bathroom)  Home Access: Stairs to enter without rails  Entrance Stairs - Number of Steps: 1+1  Entrance Stairs - Rails: None  Bathroom Shower/Tub: Walk-in shower (with theshold)  Bathroom Toilet: Standard  Bathroom Equipment: Grab bars in shower, Tub transfer bench, Grab bars around toilet  Has the patient had two or more falls in the past year or any fall with injury in the past year?: No  Receives Help From: Family (supportive spouse)  ADL Assistance: Independent (spouse helped pt get socks on)  Homemaking Responsibilities: No (spouse cooks, cleans, and does laundry)  Ambulation Assistance: Simultaneous filing. User may not have seen previous data.)  Scooting: Stand by assistance  Bed Mobility Comments: Min verb cues provided for bed mob tech. Upon sitting, pt noted dizziness. Instructed pt on pursed lip breathing to ease dizziness with good follow through. Transfers  Sit to stand: Moderate assistance;2 Person assistance;Minimal assistance  Stand to sit: Moderate assistance;2 Person assistance;Minimal assistance  Transfer Comments: Pt Min to Mod Ax2 for STS from EOB at RW level. Educated pt to push through B UEs on surface seated on and to utilize R LE to bear weight. Pt required Max verbal cues for B UE placement. During standing pt reported that he was dizzy. When instructing pt on weight-shifting to the left the L LE was not stable and exhibited buckling. Returned pt to sitting and was not able to progress to functional mobility at this time. Vitals taken in sitting prior to standing: BP: 134/71 (86), HR: 91; Vitals taken in standin/63 (81), HR: 110. Rebeca Miranda to transfer pt to bedside chair to help acclimate pt to sitting upright/positional changes to ease dizziness. Vision  Vision: Impaired  Vision Exceptions: Wears glasses for reading (cataract surgery in L eye)  Hearing  Hearing: Within functional limits  Cognition  Overall Cognitive Status: Exceptions  Arousal/Alertness: Appropriate responses to stimuli  Following Commands: Follows one step commands consistently; Follows one step commands with repetition  Attention Span: Appears intact  Memory: Appears intact  Safety Judgement: Decreased awareness of need for assistance;Decreased awareness of need for safety  Problem Solving: Assistance required to generate solutions;Assistance required to implement solutions  Insights: Fully aware of deficits  Initiation: Requires cues for some  Sequencing: Requires cues for some  Cognition Comment: Required multiple cues every transfer to push of of surface seated on with B UEs-  poor carryover  Orientation  Overall Orientation Status: Within Functional Limits  Perception  Overall Perceptual Status: WFL        Education Given To: Patient; Family  Education Provided: Role of Therapy;Plan of Care;Home Exercise Program;Precautions; ADL Adaptive Strategies;Transfer Training;Energy Conservation;Equipment; Fall Prevention Strategies  Education Provided Comments: Educated pt on ANGELA hose wear/care, safety in function, and discharge recommendations  Education Method: Demonstration;Verbal  Barriers to Learning: None  Education Outcome: Verbalized understanding;Demonstrated understanding;Continued education needed             AM-PAC Score        AM-PAC Inpatient Daily Activity Raw Score: 16 (08/03/22 1541)  AM-PAC Inpatient ADL T-Scale Score : 35.96 (08/03/22 1541)  ADL Inpatient CMS 0-100% Score: 53.32 (08/03/22 1541)  ADL Inpatient CMS G-Code Modifier : CK (08/03/22 1541)    Tinneti Score       Goals  Short Term Goals  Time Frame for Short term goals: By discharge, pt will  Short Term Goal 1: Demo MI with bed mob tasks with use of rails as needed. Short Term Goal 2: Demo CGAx1 with ADL transfers and apporp AD/DME and good safety  Short Term Goal 3: Demo CGA with functional mob in room distances with approp AD and good safety/pacing for ADL completion. Short Term Goal 4: Demo LB ADLs to Min A with use of AD/AE as needed and good safety  Short Term Goal 5: Demo and verb good understanding of fall prevention, EC/WS, ANGELA hose wear/care, possible equip needs, pain management strategies, and discharge recommendations. Patient Goals   Patient goals : To be able to walk       Co-treatment with PT warranted secondary to decreased safety and independence requiring 2 skilled therapy professionals to address individual discipline's goals.  OT addressing preparation for ADL transfer, sitting balance for increased ADL performance, sitting/activity tolerance, functional reaching, environmental safety/scanning, fall prevention, functional mobility for ADL transfers, ability to sequence and follow directions, bed mobility tech, and functional UE strength. Therapy Time   Individual Concurrent Group Co-treatment   Time In       1000(+10 minutes for chart review)   Time Out       1054   Minutes       54+10=64    Total treatment minutes: 48       Mayelin Villegas OT    Saw pt for additional session in PM to attempt to progress functional mobility. Pt completed supine to sitting EOB bed mob task with SBA. Attempted stand with Mod Ax2 at RW level, when instructing pt to weight-shift to L LE, the knee exhibited mild buckling. Returned pt to sitting. Donned knee immobilizer to stabilize L LE and educated pt on purpose and how to don. Pt Mod/Max Ax2 for STS transfer at EOB at [facility] level and required max verbal cues for B hand placement and to perform \"nose over toes\" tech; pt having difficulty shifting wt forward and having tendency to lose balance posteriorly as he attempts to grab/pull on walker before he has achieved a balanced position over MAXIMO. Able to progress to functional mobility at RW level with Min Ax2. Educated pt on AD/LLE/RLE sequence. Engaged pt in functional mobiltiy from EOB to a few steps before the door. Pt deconditioned, and noted that he had R hip pain and needed to return to sitting. Instructed pt to return to bed, Pt impuslive and demo'd poor RW safety. He attempted to sit before fully reaching bed and pushed RW to side, Pt required Max verbal cues for RW safety, B hand placement reaching back, feeling bed on B LEs, and Max Ax2 to safely return to sitting at EOB. Pt Min Ax1 to return to supine in bed with assist required for L LE. Pt required Mod verbal cues to reposition self in bed.  Treatment time for session was 13:44 to 14:28 for a total of 44 treatment minutes    Treatment minutes:  50+44=94    LARRY Jay/JOLIE

## 2022-08-03 NOTE — CARE COORDINATION
Social [facility] is requesting current PT/OT notes for precert. Notified [facility] Mobile.  Cristi Clinton

## 2022-08-03 NOTE — PROGRESS NOTES
Physical Therapy  Facility/Department: [facility] SURG  Physical Therapy Initial Assessment    Name: Cassaundra Snellen  : 1950  MRN: [id number]  Date of Service: 8/3/2022    Discharge Recommendations:  Pt is currently functional below baseline and recommend comprehensive and intensive skilled therapy by a multidisciplinary team. Would expect patient to be able to tolerate 3 hours of therapy per day and able to tolerate at least one hour up in chair. Please refer to AM-PAC score for current mobility/adl level. Patient Diagnosis(es): The encounter diagnosis was Post-op pain. Past Medical History:  has a past medical history of Cancer ([facility] [place] [address] Hypertension, Kidney stones, Neck complaint, Skin cancer, and UC (ulcerative colitis) ([facility] [place] [address] Past Surgical History:  has a past surgical history that includes Endoscopy, colon, diagnostic; Colonoscopy; Cholecystectomy; skin biopsy; Tonsillectomy; joint replacement; Hip Arthroplasty; eye surgery; Cataract removal with implant (Bilateral); Ankle surgery (Right); Knee Arthroplasty (Left, 2022); and Total knee arthroplasty (Left, 2022). Assessment   Body Structures, Functions, Activity Limitations Requiring Skilled Therapeutic Intervention: Decreased functional mobility ; Decreased ADL status; Decreased ROM; Decreased body mechanics; Decreased strength;Decreased safe awareness;Decreased endurance;Decreased balance; Increased pain;Decreased posture;Decreased cognition  Assessment: Two sessions completed POD 1. PM note at bottom of this note. Pt. with increased dizziness and buckling LLE in AM and in PM pt. with increased Rt. (non-surgical LE) hip pain limiting gait with pt. acting impulsively with poor safety awareness. Will continue to progress as able.   Therapy Prognosis: Good  Decision Making: Medium Complexity  Requires PT Follow-Up: Yes  Activity Tolerance  Activity Tolerance: Patient limited by fatigue;Patient limited by pain;Treatment limited secondary to decreased cognition     Plan   Plan  Plan: 2 times a day 7 days a week  Plan weeks: 1-2 x/day, 6-7 d/week  Current Treatment Recommendations: Strengthening, ROM, Balance training, Functional mobility training, Transfer training, ADL/Self-care training, Endurance training, Gait training, Stair training, Neuromuscular re-education, Home exercise program, Safety education & training, Patient/Caregiver education & training, Equipment evaluation, education, & procurement, Therapeutic activities  Safety Devices  Type of Devices: Call light within reach, Gait belt, Left in bed, Nurse notified, All fall risk precautions in place, Patient at risk for falls, Chair alarm in place  Restraints  Restraints Initially in Place: Yes  Restraints: all 4 bedrails in place     Restrictions  Restrictions/Precautions  Restrictions/Precautions: General Precautions, Fall Risk, Surgical Protocols, Up as Tolerated, Weight Bearing  Lower Extremity Weight Bearing Restrictions  Left Lower Extremity Weight Bearing: Weight Bearing As Tolerated  Position Activity Restriction  Other position/activity restrictions: WBAT LLE, s/p L TKA with patella resurfacing, RUE IV,     Subjective   General  Chart Reviewed: Yes  Patient assessed for rehabilitation services?: Yes  Family / Caregiver Present: Yes (wife)  Follows Commands: Within Functional Limits         Social/Functional History  Social/Functional History  Lives With: Spouse  Type of Home: House  Home Layout: Multi-level, Bed/Bath upstairs (8 steps upstairs to bedroom/bathroom)  Home Access: Stairs to enter without rails  Entrance Stairs - Number of Steps: 1+1  Entrance Stairs - Rails: None  Bathroom Shower/Tub: Walk-in shower (with theshold)  Bathroom Toilet: Standard  Bathroom Equipment: Grab bars in shower, Tub transfer bench, Grab bars around toilet  Has the patient had two or more falls in the past year or any fall with injury in the past year?: No  Receives Help From: Family (supportive spouse)  ADL Assistance: Independent (spouse helped pt get socks on)  Homemaking Responsibilities: No (spouse cooks, cleans, and does laundry)  Ambulation Assistance: Independent  Transfer Assistance: Independent  Active : Yes  Occupation: Retired  Leisure & Hobbies: currently a deacon and , spending time with wife  Vision/Hearing       Cognition   Orientation  Overall Orientation Status: Within Functional Limits  Cognition  Overall Cognitive Status: Exceptions  Arousal/Alertness: Appropriate responses to stimuli  Following Commands: Follows one step commands consistently; Follows one step commands with repetition  Attention Span: Appears intact  Memory: Appears intact  Safety Judgement: Decreased awareness of need for assistance;Decreased awareness of need for safety  Problem Solving: Assistance required to generate solutions;Assistance required to implement solutions  Insights: Fully aware of deficits  Initiation: Requires cues for some  Sequencing: Requires cues for some  Cognition Comment: Required multiple cues every transfer to push of of surface seated on with B UEs-  poor carryover     Objective   Observation/Palpation  Posture: Fair  Observation: Dizziness with position changes POD1 in AM session; also with buckling LLE in WB. Unsafe to amb. away from bed. Scar: L TKA incision        AROM RLE (degrees)  RLE AROM: WFL  AROM LLE (degrees)  LLE General AROM: knee 0-60 degrees  Strength LLE  Comment: + LAQ, + SLR with quad lag        Balance  Sitting: Intact  Standing: With support (Mod/Max x2 (L LE buckling))  Gait  Overall Level of Assistance:  (Not safe to attempt functional mobility at this time d/t  L LE significantly buckling upon standing, and dizziness with standing.)     Transfers  Sit to Stand: 2 Person Assistance; Moderate Assistance (needs assist with fwd. wt. shift and repeated cues for proper/ safe hand placement with RW)  Stand to sit: Moderate Assistance;2 Person Assistance Exercise Treatment: Pt. instructed in quad/glut isometrics to be done x 10 reps every 1-2 hrs. as able. Encouraged pt. to frequently AP for increased LE circulation and perform heel slides as needed to prevent hip/knee stiffness. Static Standing Balance Exercises: Stood EOB on two occasions for several min. Both times pt. needed to sit due to dizziness. BP seated 134/71 (MAP 86) HR 91. BP standing 138/63 (MAP 81) . Dizziness not orthostatic in nature. Pt. performed wt. shifts L/R and presented with decreased quad control / knee buckling. For both of these reasons pt. unsafe to proceed with gait training. Used TIMOTHY STEDY to transfer pt. bed to chair. Positioned pt. for comfort with chair alarm and informed RN. OutComes Score                                                  AM-PAC Score  -PAC Inpatient Mobility Raw Score : 11 (08/03/22 1542)  AM-PAC Inpatient T-Scale Score : 33.86 (08/03/22 1542)  Mobility Inpatient CMS 0-100% Score: 72.57 (08/03/22 1542)  Mobility Inpatient CMS G-Code Modifier : CL (08/03/22 1542)                 Goals  Short Term Goals  Time Frame for Short term goals: 12 visits  Short term goal 1: Inc bed mobility to [employer] term goal 2: Inc STS transfers to [address] with RW  Short term goal 3: Initiate and assess gait goals as appropriate  Short term goal 4: Pt to ascend/ descend 2 stairs without handrails Pranav  Short term goal 5: Pt to tolerate 30 minutes of daily PT for ther act, ther ex, balance, and gait  Patient Goals   Patient goals : return home safely       Education  Patient Education  Education Given To: Patient; Family  Education Provided: Role of Therapy;Plan of Care;Home Exercise Program;Fall Prevention Strategies  Education Provided Comments: impt. of OOB, walker safety, see EX section  Education Method: Verbal  Barriers to Learning: None  Education Outcome: Verbalized understanding;Demonstrated understanding;Continued education needed      Therapy Time   Individual Concurrent Group Co-treatment   Time In 1004         Time Out 1979  (also 3557- 6963)         Minutes 45          Treatment time:  88 min. RETURNED IN PM FROM [id number]. HAD JUST GOTTEN BACK TO BED 10 MIN. AGO/ HAD BEEN UP FOR SEVERAL HOURS AFTER AM SESSION. FEELING VERY FATIGUED BUT WILLING TO WORK WITH THERAPY. MIN A BED MOB. IMPROVED DIZZINESS IN BOTH SITTING AND STANDING THIS SESSION. Once standing safely at bedside with RW, took pt. through pre-gait sequence to ensure quad control prior to ambulation. Assessed control of knee during static standing, wt. shifts and steps in place. Pt. With good quad control and able to proceed with ambulation. Pt. With slight buckling of knee so immobilizer used for safety during gait. Proceeded to amb. 10ft. Away from bed, min A with RW, when pt. began complaining of Rt. Hip pain (non-operative side) and needed to sit. Stated he was able to amb. back to bed, however began moving impulsively, letting go of RW and grabbing bed rail. Mod A to steady pt. And correct hand placement back to RW. Had pt. turn fully with RW with max EDU. Again on walker safety. In seated position, worked on LAQ LLE (AROM) and showed how to assist with Rt. LE as needed. Also showed self ROM tech. using Rt. LE to help with L knee flexion. Pt. With good demo of both. Returned pt to supine, min A., and also reviewed quad/glut sets, heel slides and AP that pt. Told to work on on own in room until next session. Will continue to progress. Next session will again assess LLE quad control in WB and determine if immobilizer still needed.            Antonio Javed, PT

## 2022-08-03 NOTE — PROGRESS NOTES
Orthopedic Progress Note    Patient:  Simone Pineda, 67 y.o. male  YOB: 1950       Subjective:  Patient seen and examined. No complaints or concerns. Pain controlled on current regimen. No issues overnight. Denies fever, HA, CP, SOB, N/V. Patient reports some mild dizziness. Tolerating PO intake. Will work w/ PT more today prior to discharge. Objective:   Vitals:    08/03/22 0748   BP: (!) 152/81   Pulse: 86   Resp: 18   Temp: 97.7 °F (36.5 °C)   SpO2: 95%     Gen: NAD, cooperative    Cardiovascular: Regular rate    Respiratory: Symmetric chest rise. No accessory muscle use    LLE: Dressings and ACE, C/D/I. Mildly tender to palpation about the knee. Able to flex 0-85 degrees. Compartments soft. 2+ DP pulse. TA/EHL/FHL/GS motor intact. Deep and Superficial Peroneal/Saphenous/Sural/Plantar SILT.      Recent Labs     08/03/22  0600   WBC 11.9*   HGB 14.0   HCT 44.2         Anticoag: Eliquis    Impression/Plan: 67 y.o. male with:    Left total knee arthroplasty, POD#1      - No plans for further orthopedic intervention  - WBAT LLE  - Maintain dressings, reinforce as needed  - Diet OK  - DVT: Eliquis  - Multimodal pain regimen  - Ice for pain and swelling  - PT/OT eval and treat  - OK for discharge following PT evaluation  - Follow up w/ Dr. Ana Laura Dupont in 10-14 days in clinic    Electronically signed by Belen Gross DO on 8/3/2022 at 9:00 AM.

## 2022-08-03 NOTE — PLAN OF CARE
Problem: Discharge Planning  Goal: Discharge to home or other facility with appropriate resources  Outcome: Not Progressing     Problem: Pain  Goal: Verbalizes/displays adequate comfort level or baseline comfort level  8/3/2022 1521 by Michelle Corona RN  Outcome: Not Progressing  8/3/2022 1141 by Chayo Albright RN  Flowsheets (Taken 8/3/2022 1141)  Verbalizes/displays adequate comfort level or baseline comfort level:   Encourage patient to monitor pain and request assistance   Administer analgesics based on type and severity of pain and evaluate response   Assess pain using appropriate pain scale   Implement non-pharmacological measures as appropriate and evaluate response  8/3/2022 0227 by Pawel Connors RN  Outcome: Progressing  Note: Patient has scheduled and as needed pain control and will ask for it. Problem: ABCDS Injury Assessment  Goal: Absence of physical injury  Outcome: Not Progressing  Flowsheets (Taken 8/3/2022 1518)  Absence of Physical Injury: Implement safety measures based on patient assessment     Problem: Safety - Adult  Goal: Free from fall injury  8/3/2022 1521 by Michelle Corona RN  Outcome: Not Progressing  Flowsheets (Taken 8/3/2022 1518)  Free From Fall Injury:   Based on caregiver fall risk screen, instruct family/caregiver to ask for assistance with transferring infant if caregiver noted to have fall risk factors   Instruct family/caregiver on patient safety  8/3/2022 1141 by Cahyo Albright RN  Flowsheets (Taken 8/3/2022 1141)  Free From Fall Injury:   Based on caregiver fall risk screen, instruct family/caregiver to ask for assistance with transferring infant if caregiver noted to have fall risk factors   Instruct family/caregiver on patient safety  8/3/2022 0227 by Pawel Connors RN  Outcome: Progressing  Note: Patient will be free from falls this shift and is aware of personal limits. Patient will work with therapy prior to discharge.       Problem: Skin/Tissue Integrity - Adult  Goal: Incisions, wounds, or drain sites healing without S/S of infection  Outcome: Not Progressing     Problem: Musculoskeletal - Adult  Goal: Return mobility to safest level of function  8/3/2022 1521 by Leisa Guaman RN  Outcome: Not Progressing  8/3/2022 1141 by Uzair Wells RN  Outcome: Progressing  Flowsheets (Taken 8/3/2022 1141)  Return Mobility to Safest Level of Function:   Assess patient stability and activity tolerance for standing, transferring and ambulating with or without assistive devices   Assist with transfers and ambulation using safe patient handling equipment as needed   Ensure adequate protection for wounds/incisions during mobilization   Obtain physical therapy/occupational therapy consults as needed   Instruct patient/family in ordered activity level  Goal: Return ADL status to a safe level of function  8/3/2022 1521 by Leisa Guaman RN  Outcome: Not Progressing  8/3/2022 1141 by Uzair Wells RN  Flowsheets (Taken 8/3/2022 1141)  Return ADL Status to a Safe Level of Function:   Obtain physical therapy/occupational therapy consults as needed   Administer medication as ordered   Assist and instruct patient to increase activity and self care as tolerated   Assess activities of daily living deficits and provide assistive devices as needed

## 2022-08-03 NOTE — OP NOTE
Operative Note      Patient: Anthony Hassan  YOB: 1950  MRN: [id number]     Date of Procedure: 8/2/2022     Pre-Op Diagnosis: Left knee osteoarthritis     Post-Op Diagnosis: Left knee osteoarthritis       Procedure(s): Left cementless total knee arthroplasty     Surgeon(s): Mirza Kennedy DO     Assistant: Resident: Missy Wilkerson DO; Sara Krabbe, DO     Anesthesia: General     Estimated Blood Loss (mL): 75 mL     Tourniquet time: None     Complications: None     Specimens: * No specimens in log *     Implants:  Implant Name Type Inv. Item Serial No.  Lot No. LRB No. Used Action   PSN FEM CR POR CCR STD SZ11 L - [id number]   PSN FEM CR POR CCR STD SZ11 L   [facility] [id number] Left 1 Implanted   PSN TIB POR 2 PEG SZ G L - [id number]   PSN TIB POR 2 PEG SZ G L   [facility] [id number] Left 1 Implanted   PSN MC VE ASF L 10MM 8-11 [facility] [id number]   PSN MC VE ASF L 10MM 8-11 [facility] [id number] Left 1 Implanted          Drains: * No LDAs found *     Findings: Left knee osteoarthritis      Patient is a 79-year-old male who presented to [facility]'s surgical department for left total knee arthroplasty. Patient has had progressively worsening left knee pain which has failed to improve despite conservative therapy. Patients' symptoms are greatly affecting their usual activities of daily living and quality of life and as a result the patient has elected to proceed with surgical intervention at this time. The patient was identified preoperatively where consent was obtained, signed, placed on the chart. The procedure was described. Patients questions were answered. All details of the procedure, as well as risks, benefits and alternatives, including the option of non operative versus operative treatment were discussed.  The patient understands that the risks of the surgery include but are not limited to: bleeding, loss of fixation, hardware failure, length discrepancy, limp, transfusion, skin blistering or breakdown, possible need for further surgery, bone grafting, infection, nerve injury, paralysis, numbness, blood vessel injury, excessive scaring, wound complication or breakdown, failure of symptoms to improve or actual deterioration in condition, significant acute and/or chronic pain, possible need for amputation, permanent loss of motion, and permanent loss of function. As well as the general complications of anesthesia, which include but are not limited to: myocardial infarction and/or heart attack, stroke, multi organ system failure or even possible death, prolonged hospital stay, blood clots, pulmonary embolism, abnormal reaction to medication, visual and neurological disturbances, constipation, ischemic bowel, bowel obstruction, bowel perforation, ileus and mental status changes. No guarantees were made. The patient was administered IV antibiotics perioperatively. The patient was also administered 1 g of IV tranexamic acid just prior to incision and a second 1 g aliquot just after closure of incision. He was taken the operative suite where he was placed upon the operative table. Patient underwent general anesthesia. A prior preoperative regional nerve block was performed to the operative extremity in the preoperative holding area. The operative lower extremity was then prepped and draped in a sterile fashion. A surgical timeout was held with all team members in agreement. An incision was made anteriorly over the midline of the knee centered over the patella. Sharp incision was carried through the skin and subcutaneous tissue. Full-thickness skin flaps were raised over the extensor mechanism, with subsequent medial parapatellar arthrotomy. Infrapatellar fat pad was excised. The suprapatellar synovitis was incised. A subperiosteal dissection was made over the proximal medial tibia to the posterior medial corner.  Anterior cruciate ligament was excised as well as the medial and lateral menisci. The knee was then flexed and retractors were placed retracting the collateral ligaments. Attention was then drawn to the patella which was slightly everted. No significant chondromalacia of the patella was appreciated and removal of the lateral patellar facet was performed with oscillating saw. Next the intramedullary femur guide was utilized first by predrilling through the intercondylar notch with subsequent intramedullary dowel, copious irrigation in canal to remove debris. The appropriate distal cut was made utilizing with an oscillating saw. The 4-in-1 cutting block was then placed in the appropriate rotation with subsequent anterior, posterior, anterior chamfer, and posterior chamfer cuts. The cutting block was then removed and attention was then drawn to the tibia. Next the intramedullary drill was used to find the tibial canal, the tibial intramedullary dowel was used to place guide our tibial cut which was then made with an oscillating saw. Appropriate soft tissue balancing was done medially and laterally to allow for balancing in flexion and extension and in varus and valgus. A laminar  was used to clean the posterior and lateral aspects of the knee. The trial femur and tibia components were placed with a trial polyethylene liner. The knee was put through range of motion was found to be stable throughout the arc of range of motion and was balanced in flexion, extension, varus, valgus. The trials were removed and final preparation of the tibia was made with a tibial drill and keel punch. The knee was then thoroughly irrigated. The implantable cementless components were then placed and impacted till fully seated and a trial polyethylene liner was placed with appropriate extension/flexion and varus/valgus stability, final poly placed.  The knee was put through range of motion was found to be stable throughout the

## 2022-08-03 NOTE — PROGRESS NOTES
While doing assessment patient had a pillow under his left foot, which I removed and educated that this can not be there per policy of the doctor and should have had prior education. Patient had knowledge of this and ignored it.

## 2022-08-04 VITALS
HEART RATE: 103 BPM | RESPIRATION RATE: 16 BRPM | OXYGEN SATURATION: 97 % | WEIGHT: 309 LBS | SYSTOLIC BLOOD PRESSURE: 158 MMHG | HEIGHT: 72 IN | BODY MASS INDEX: 41.85 KG/M2 | TEMPERATURE: 97.7 F | DIASTOLIC BLOOD PRESSURE: 70 MMHG

## 2022-08-04 PROCEDURE — 2580000003 HC RX 258: Performed by: STUDENT IN AN ORGANIZED HEALTH CARE EDUCATION/TRAINING PROGRAM

## 2022-08-04 PROCEDURE — 97530 THERAPEUTIC ACTIVITIES: CPT

## 2022-08-04 PROCEDURE — 97535 SELF CARE MNGMENT TRAINING: CPT

## 2022-08-04 PROCEDURE — 97116 GAIT TRAINING THERAPY: CPT

## 2022-08-04 PROCEDURE — 97110 THERAPEUTIC EXERCISES: CPT

## 2022-08-04 PROCEDURE — 99225 PR SBSQ OBSERVATION CARE/DAY 25 MINUTES: CPT | Performed by: INTERNAL MEDICINE

## 2022-08-04 PROCEDURE — 6370000000 HC RX 637 (ALT 250 FOR IP): Performed by: STUDENT IN AN ORGANIZED HEALTH CARE EDUCATION/TRAINING PROGRAM

## 2022-08-04 RX ADMIN — OXYCODONE HYDROCHLORIDE 10 MG: 5 TABLET ORAL at 14:04

## 2022-08-04 RX ADMIN — OXYCODONE HYDROCHLORIDE 10 MG: 5 TABLET ORAL at 09:57

## 2022-08-04 RX ADMIN — ACETAMINOPHEN 650 MG: 325 TABLET, FILM COATED ORAL at 12:06

## 2022-08-04 RX ADMIN — SODIUM CHLORIDE, PRESERVATIVE FREE 10 ML: 5 INJECTION INTRAVENOUS at 09:53

## 2022-08-04 RX ADMIN — MESALAMINE 1.2 G: 1.2 TABLET, DELAYED RELEASE ORAL at 09:52

## 2022-08-04 RX ADMIN — APIXABAN 2.5 MG: 2.5 TABLET, FILM COATED ORAL at 09:52

## 2022-08-04 RX ADMIN — ONDANSETRON 4 MG: 4 TABLET, ORALLY DISINTEGRATING ORAL at 16:11

## 2022-08-04 ASSESSMENT — PAIN DESCRIPTION - LOCATION
LOCATION: KNEE

## 2022-08-04 ASSESSMENT — PAIN DESCRIPTION - ORIENTATION
ORIENTATION: LEFT

## 2022-08-04 ASSESSMENT — PAIN SCALES - GENERAL
PAINLEVEL_OUTOF10: 7
PAINLEVEL_OUTOF10: 7
PAINLEVEL_OUTOF10: 3

## 2022-08-04 ASSESSMENT — PAIN DESCRIPTION - DESCRIPTORS
DESCRIPTORS: ACHING
DESCRIPTORS: ACHING

## 2022-08-04 NOTE — PROGRESS NOTES
[address] Encounter    TELEHEALTH VISIT -- Audio Only     SUBJECTIVE:    Nahed Boucher is a 61 y.o. male evaluated via telephone on 10/27/2021. Consent:  Jose Mccoy and/or health care decision maker is aware that he may receive a bill for this telephone service, depending on his insurance coverage, and has provided verbal consent to proceed: Yes    Documentation:  I communicated with the patient about:  Chief Complaint   Patient presents with    Cough     Patient here with cough, fever, sore throat , congestion, sinusitus    Sinus Problem    Nasal Congestion    Fever     100.1 highest        History of Present Illness:  Presenting symptoms: fever, chills, cough, sore throat, nasal congestion, headache, muscle pain, abdominal pain and malaise. Symptoms began: 10/24   Max temperature in last 24 hrs: 100.1     Patient denies: shortness of breath, sputum production, chest pain, abdominal pain, N/V/D, loss of smell, and loss of taste. Treatment to date: OTC cough/ cold medication which has been, somewhat effective. Exposure source: unknown- works in [place]. Relevant PMH: no pertinent PMH. Non-smoker. No recent travel. Fully vaccinated for COVID-19 [employer], 04/2021). Review of Systems   Constitutional: Positive for chills, fatigue and fever. HENT: Positive for congestion, postnasal drip, sinus pressure, sinus pain and sore throat. Respiratory: Positive for cough. Negative for chest tightness and shortness of breath. Gastrointestinal: Negative for abdominal pain, diarrhea, nausea and vomiting. Musculoskeletal: Positive for arthralgias (hips, back). Neurological: Positive for headaches. Negative for dizziness and light-headedness.      OBJECTIVE:     Vital Signs: (As obtained by: pt or caregiver)  Patient-Reported Vitals 10/27/2021   Patient-Reported Temperature 100.1 F      Exam: N/A  (telehealth audio visit)     POC Testing Today: Orthopedic Progress Note    Patient:  Nahid Lizarraga, 67 y.o. male  YOB: 1950       Subjective:  Patient seen and examined. No complaints or concerns. Pain controlled on current regimen. No issues overnight. Dressing in place is clean/dry/intact. Denies CP/SOB, N/V, numbness, or tingling. Awaiting precert to SNF. PT: Difficulty yesterday with PT due to buckling of LLE while attempting ambulation. Objective:   Vitals:    08/04/22 0725   BP: 131/78   Pulse: 97   Resp: 18   Temp: 97.7 °F (36.5 °C)   SpO2: 95%     Gen: NAD, cooperative  Cardiovascular: Regular rate  Respiratory: Symmetric chest rise. No accessory muscle use    LLE: Dressings are C/D/I. Mildly tender to palpation about the knee. Able to flex 0-80 degrees actively in bed. Compartments soft. 2+ DP pulse. TA/EHL/FHL/GS motor intact. Deep and Superficial Peroneal/Saphenous/Sural/Plantar SILT. Recent Labs     08/03/22  0600   WBC 11.9*   HGB 14.0   HCT 44.2         Anticoag: Eliquis    Impression/Plan: 67 y.o. male with:    Left total knee arthroplasty, POD#2    - WBAT LLE  - Maintain dressings, reinforce as needed per nursing. Patient has adhesive allergy so has soft dressing with 4x4's, abd pads, and kerlex/ACE wrap around surgical site. Keep clean and dry.   - DVT: Eliquis  - Multimodal pain regimen  - Ice for pain and swelling  - PT/OT eval and treat  - OK for discharge following PT evaluation  - Follow up w/ Dr. Altagracia Lima in 10-14 days in clinic    Electronically signed by Preet Benoit DO, on 8/4/2022 at 8:24 AM. current illness diagnosis as well as when to seek additional healthcare due to changing or for worsening symptoms. Patient voiced understanding.      Electronically signed by VINNY Lara CNP on 10/27/2021 at 12:50 PM

## 2022-08-04 NOTE — CARE COORDINATION
Social Work-Left message for MMO regarding precert. Spoke with patient. If [facility] denies acute rehab, he would like to go to [facility].  Sent referral. Marlee Fernandez

## 2022-08-04 NOTE — PROGRESS NOTES
Physical Therapy  Facility/Department: [facility] MED SURG  Rehabilitation Physical Therapy Treatment Note    NAME: Brianda Ocampo  : 1950 (77 y.o.)  MRN: [id number]  CODE STATUS: Full Code    Date of Service: 22   Pt currently functioning below baseline. Recommend daily inpatient skilled therapy at time of discharge to maximize long term outcomes and prevent re-admission. Please refer to AM-PAC score for current level of function. Restrictions:  Restrictions/Precautions: General Precautions; Fall Risk;Surgical Protocols; Up as Tolerated;Weight Bearing  Lower Extremity Weight Bearing Restrictions  Left Lower Extremity Weight Bearing: Weight Bearing As Tolerated     SUBJECTIVE  Subjective  Subjective: Patient up in bed and agreeable to PT treatment. LEROY Noyola states patient is appropriate for PT treatment this date. Pain: Patient with c/o pain on RLE non operative side throughout treatment. OBJECTIVE  Cognition  Overall Cognitive Status: Exceptions  Arousal/Alertness: Appropriate responses to stimuli  Following Commands: Follows one step commands consistently; Follows one step commands with repetition  Attention Span: Appears intact  Memory: Appears intact  Safety Judgement: Decreased awareness of need for assistance;Decreased awareness of need for safety  Problem Solving: Assistance required to generate solutions;Assistance required to implement solutions  Insights: Fully aware of deficits  Initiation: Requires cues for some  Sequencing: Requires cues for some  Cognition Comment: Max verbal cues for safety awareness to follow proper tech with use of RW and mobility. Orientation  Overall Orientation Status: Within Functional Limits  Orientation Level: Oriented X4    Functional Mobility  Bed Mobility  Overall Assistance Level: Moderate Assistance; Requires x 2 Assistance  Additional Factors: Set-up; Verbal cues; Increased time to complete; Head of bed raised; With handrails  Roll Left  Assistance Level:  Moderate assistance; Requires x 2 assistance  Skilled Clinical Factors: Assist requires for trunk and LE initiation of motion to promote bed mobility tasks. Supine to Sit  Assistance Level: Moderate assistance; Requires x 2 assistance  Skilled Clinical Factors: Patient requires MOD x2 support for Upper body and JACQUES LE OOB this date. Patient requires increased time and encouragement throughout. Scooting  Assistance Level: Moderate assistance; Requires x 2 assistance  Skilled Clinical Factors: Patient requries assist for DON/DOFF of immobilizer and compression stockings this date. Patient unable to attempt tasks this date. Patient requires increased assist to scoot all the way out and place feet on floor. Requires education to place operated LLE out in front a bit to reduce discomfort in standing tasks. Patient consistantly reaches for walker to pull up into standing even though multiple attempts to educate patient on proper sequences were given throughout. Balance  Sitting Balance: Supervision  Standing Balance: Moderate assistance  Standing Balance  Comments: Patient upon initial stance with a posterior lean. Patient with poor carry over of task break down and education from therapists. Patient with poor safety awareness, poor eccentric quad control JACQUES. Transfers  Surface: To chair with arms;From bed;Standard toilet  Additional Factors: Set-up; Verbal cues; Hand placement cues; Increased time to complete  Device: Walker  Sit to Stand  Assistance Level: Moderate assistance; Requires x 2 assistance  Stand to Sit  Assistance Level: Moderate assistance; Requires x 2 assistance  Bed To/From Chair  Technique: Stand step  Assistance Level: Moderate assistance; Requires x 2 assistance  Skilled Clinical Factors: Patiet due to poor safety awareness requries increased assist Patient attempts to  walker vs. pushing for progression in ambulation  Patient educated frequently on proper use of assistive device with fair carry over however reverts back to picking it up several times then reeducated on proper use. Environmental Mobility  Ambulation  Surface: Level surface  Device: Rolling walker  Distance: 15 x1 to rest room, 5 feet x1 to bed, then 10 feet to recliner with RW  Activity: Within Room  Activity Comments: Patietn with inconsistant follow through on proper use of assistive device. Patient requries MAX encouragement and education throughout treatement. Additional Factors: Set-up; Verbal cues; Hand placement cues; Increased time to complete  Assistance Level: Moderate assistance; Requires x 2 assistance  Gait Deviations: Slow eliecer;Decreased step length bilateral;Unsteady gait      Neuromuscular Education  NDT Treatment: Gait ;Lower extremity; Sitting;Standing      PT Exercises  A/AROM Exercises: quad and glute sets, ankle pumps, heel slides      ASSESSMENT/PROGRESS TOWARDS GOALS   Am pac 10/24    Assessment  Assessment: Patient s/p TKA LLE. Patient with progression in ambulation. Patient would benefit from continued skilled PT treatment to maximize return to PLOF. Activity Tolerance: Patient limited by fatigue;Patient limited by pain;Treatment limited secondary to decreased cognition  Discharge Recommendations: Patient would benefit from continued therapy after discharge  PT Equipment Recommendations  Equipment Needed: Yes  Walker: Rolling    Goals  Patient Goals   Patient goals : return home safely  Short Term Goals  Time Frame for Short term goals: 12 visits  Short term goal 1: Inc bed mobility to [employer] term goal 2:  Inc STS transfers to [address] with RW  Short term goal 3: Initiate and assess gait goals as appropriate  Short term goal 4: Pt to ascend/ descend 2 stairs without handrails Pranav  Short term goal 5: Pt to tolerate 30 minutes of daily PT for ther act, ther ex, balance, and gait    PLAN OF CARE/SAFETY  Plan  Plan: 2 times a day 7 days a week  Plan weeks: 1-2 x/day, 6-7 d/week  Current Treatment Recommendations: Strengthening;ROM;Balance training;Functional mobility training;Transfer training;ADL/Self-care training; Endurance training;Gait training;Stair training;Neuromuscular re-education;Home exercise program;Safety education & training;Patient/Caregiver education & training;Equipment evaluation, education, & procurement; Therapeutic activities  Safety Devices  Type of Devices: Call light within reach;Gait belt;Left in bed;Nurse notified; All fall risk precautions in place; Patient at risk for falls; Chair alarm in place  Restraints  Restraints Initially in Place: Yes  Restraints: all 3 bedrails in place    EDUCATION  Education  Education Given To: Patient  Education Provided: Role of Therapy;Precautions; Safety;Transfer Training;Mobility Training  Education Method: Demonstration;Verbal;Teach Back  Barriers to Learning: None  Education Outcome: Verbalized understanding;Continued education needed        Therapy Time   Individual Concurrent Group Co-treatment   Time In [facility]         Time Out 0950         Minutes 61           Co-treatment with OT warranted secondary to decreased safety and independence requiring 2 skilled therapy professionals to address individual discipline's goals. PT addressing pre gait trunk strengthening, weight shifting prior to transfers, transfer training, and postural control in sitting.    Frankie Klein PTA, 08/04/22 at 11:29 AM

## 2022-08-04 NOTE — PROGRESS NOTES
Pt discharged to [facility] in good condition with belongings  Discharge instructions given  Scripts with pt. All   Pt denies having any further questions at this time  Locked up home medication(s)/personal items given to patient at discharge  Patient/family state they have everything they were admitted with. Dressing change supplies and hibiclens sent home with patient  Report called by writer to RN at [facility]. They're expecting pt.

## 2022-08-04 NOTE — PROGRESS NOTES
[facility]  Office: [address], DO, Aureajourdan Crespo, DO, Janiyatim Chin, DO, Luis Armando Hayden, DO, Geraldine Valle MD, René Stein MD, Nia Akins MD, Deborah Dos Santos MD,  Zakiya Monique MD, Shay Ellis MD, Macrina Humphrey, DO, Brendan Gonzalez MD,  Jacki Tadeo MD, Kraig Ledesma MD, Randall Workman, DO, Francisco Anderson MD, Nathaniel Hernandez MD, Kinga Newton MD, Allan Wagner DO, Bijal Ackerman MD, Constantino Beverly MD, Alferd Felty, CNP,  Vivi Pelt, CNP, Ethyl Mercury, CNP, Chiquita Speaks, CNP, Marilin Dash PAAnilaC, Delmy Ghosh, DNP, Bonilla Hensley, CNP, Clifton Mancini, CNP, Mauricio Brittle, CNP, Boaz Armando, CNP, Anita Dang, CNP, Risa Wan, CNS, Jonathon Vargas, DNP, Alec Monroy, CNP, Deborah Bates, CNP, Noe Fernandez, CNP           [facility]    Progress Note    8/4/2022    9:07 AM    Name:   Alfredo Hernández  MRN:     [id number]     Acct:      [de-identified]   Room:   2006/2006-02  IP Day:  0  Admit Date:  8/2/2022  8:27 AM    PCP:   Lisa Napier MD  Code Status:  Full Code    Subjective:     C/C: Knee pain, admitted for arthroplasty    Interval History Status: improved. Patient's postoperative pain is relatively well controlled. Denies any chest pain, shortness of breath, nausea vomiting, fevers or chills. Continues to have buckling of the operative knee with ambulation. Brief History: This is a 69-year-old male who is a retired  and  that presents with a complaint of left knee pain for arthroplasty. He has had longstanding history of worsening arthritic symptoms and has failed conservative measures. He presents at this time for total knee arthroplasty and we are asked to see him for postoperative medical management of hypertension and ulcerative colitis.     Review of Systems:     Constitutional:  negative for chills, fevers, sweats  Respiratory:  negative for cough, dyspnea on exertion, shortness of breath, wheezing  Cardiovascular:  negative for chest pain, chest pressure/discomfort, lower extremity edema, palpitations  Gastrointestinal:  negative for abdominal pain, constipation, diarrhea, nausea, vomiting  Neurological:  negative for dizziness, headache    Medications: Allergies: Allergies   Allergen Reactions    Adhesive Tape        Current Meds:   Scheduled Meds:    bupivacaine liposome  10 mL SubCUTAneous Once    vitamin D  5,000 Units Oral Daily    fluticasone  1 spray Each Nostril Daily    mesalamine  1.2 g Oral Daily with breakfast    sodium chloride flush  5-40 mL IntraVENous 2 times per day    acetaminophen  650 mg Oral 4 times per day    apixaban  2.5 mg Oral BID     Continuous Infusions:    sodium chloride      sodium chloride       PRN Meds: zolpidem, sodium chloride flush, sodium chloride, oxyCODONE **OR** oxyCODONE, ondansetron **OR** ondansetron    Data:     Past Medical History:   has a past medical history of Cancer ([facility] [address] Hypertension, Kidney stones, Neck complaint, Skin cancer, and UC (ulcerative colitis) ([facility] Social History:   reports that he has never smoked. He has never used smokeless tobacco. He reports current alcohol use. He reports that he does not use drugs. Family History: History reviewed. No pertinent family history. Vitals:  /78   Pulse 97   Temp 97.7 °F (36.5 °C) (Oral)   Resp 18   Ht 6' (1.829 m)   Wt (!) 309 lb (140.2 kg)   SpO2 95%   BMI 41.91 kg/m²   Temp (24hrs), Av.9 °F (36.6 °C), Min:97.7 °F (36.5 °C), Max:98.1 °F (36.7 °C)    No results for input(s): POCGLU in the last 72 hours. I/O (24Hr):     Intake/Output Summary (Last 24 hours) at 2022 0907  Last data filed at 2022 0727  Gross per 24 hour   Intake 900 ml   Output 775 ml   Net 125 ml       Labs:  Hematology:  Recent Labs     22  0920 22  1529 22  0600   WBC 7.3  --  11.9*   RBC 5.00  --  4.58   HGB 15.2 14.4 14.0   HCT 47.3 44.1 44.2   MCV 94.6  --  96.5   MCH 30.4  --  30.6   MCHC 32.1  --  31.7   RDW 12.3  --  12.5     --  250   MPV 9.4  --  9.6     Chemistry:No results for input(s): NA, K, CL, CO2, GLUCOSE, BUN, CREATININE, MG, ANIONGAP, LABGLOM, GFRAA, CALCIUM, CAION, PHOS, PSA, PROBNP, TROPHS, CKTOTAL, CKMB, CKMBINDEX, MYOGLOBIN, DIGOXIN, LACTACIDWB in the last 72 hours. No results for input(s): PROT, LABALBU, LABA1C, T5QHJDC, N9JAGUJ, FT4, TSH, AST, ALT, LDH, GGT, ALKPHOS, LABGGT, BILITOT, BILIDIR, AMMONIA, AMYLASE, LIPASE, LACTATE, CHOL, HDL, LDLCHOLESTEROL, CHOLHDLRATIO, TRIG, VLDL, GZM21NJ, PHENYTOIN, PHENYF, URICACID, POCGLU in the last 72 hours. ABG:No results found for: POCPH, PHART, PH, POCPCO2, QYS3TQM, PCO2, POCPO2, PO2ART, PO2, POCHCO3, WVN4ZRS, HCO3, NBEA, PBEA, BEART, BE, THGBART, THB, DKW5BMS, BOGW9NQE, U6WBLNHY, O2SAT, FIO2  No results found for: SPECIAL  No results found for: CULTURE    Radiology:  XR KNEE LEFT (3 VIEWS)    Result Date: 8/2/2022  Expected immediate postoperative findings status post knee arthroplasty.        Physical Examination:        General appearance:  alert, cooperative and no distress  Mental Status:  oriented to person, place and time and normal affect  Lungs:  clear to auscultation bilaterally, normal effort  Heart:  regular rate and rhythm, no murmur  Abdomen:  soft, nontender, nondistended, normal bowel sounds, no masses, hepatomegaly, splenomegaly  Extremities:  no edema, redness, tenderness in the calves  Skin:  no gross lesions, rashes, induration    Assessment:        Hospital Problems             Last Modified POA    * (Principal) S/P total knee arthroplasty, left 8/2/2022 Yes    Arthritis of left knee 8/2/2022 Yes    Post-op pain 8/2/2022 Yes    Essential hypertension 8/3/2022 Yes    Ulcerative colitis ([address] 8/3/2022 Yes       Plan:        PT and OT  Pain control per orthopedics  DVT prophylaxis  Continue present antihypertensive therapy  Continue mesalamine  Medically stable for discharge to acute rehab versus home pending progress with therapy    Fermín Cuadra DO  8/4/2022  9:07 AM

## 2022-08-04 NOTE — PROGRESS NOTES
Occupational Therapy  Facility/Department: [facility]  Rehabilitation Occupational Therapy Daily Treatment Note    Date: 22  Patient Name: Anthony Hassan       Room:   MRN: [id number]  Account: [de-identified]   : 1950  (77 y.o.) Gender: male       Past Medical History:  has a past medical history of Cancer ([facility] [address] Hypertension, Kidney stones, Neck complaint, Skin cancer, and [facility] Past Surgical History:   has a past surgical history that includes Endoscopy, colon, diagnostic; Colonoscopy; Cholecystectomy; skin biopsy; Tonsillectomy; joint replacement; Hip Arthroplasty; eye surgery; Cataract removal with implant (Bilateral); Ankle surgery (Right); Knee Arthroplasty (Left, 2022); and Total knee arthroplasty (Left, 2022). Restrictions  Restrictions/Precautions: General Precautions; Fall Risk;Surgical Protocols; Up as Tolerated;Weight Bearing  Other position/activity restrictions: WBAT LLE, s/p L TKA with patella resurfacing, RUE IV,  Left Lower Extremity Weight Bearing: Weight Bearing As Tolerated  Required Braces or Orthoses  Left Lower Extremity Brace: Knee Immobilizer  Required Braces or Orthoses?: Yes    Subjective  Subjective: \"I know I need to move more. .. Michael Gutter Michael Gutter Michael Gutter wait I need to rest!\"  Restrictions/Precautions: General Precautions; Fall Risk;Surgical Protocols; Up as Tolerated;Weight Bearing       Objective     Cognition  Overall Cognitive Status: Exceptions  Arousal/Alertness: Appropriate responses to stimuli  Following Commands: Follows one step commands consistently; Follows one step commands with repetition  Attention Span: Appears intact  Memory: Appears intact  Safety Judgement: Decreased awareness of need for assistance;Decreased awareness of need for safety  Problem Solving: Assistance required to generate solutions;Assistance required to implement solutions  Insights: Fully aware of deficits  Initiation: Requires cues for some  Sequencing: Requires cues for some  Cognition Comment: Max verbal cues for safety awareness to follow proper tech with use of RW and mobility. Orientation  Overall Orientation Status: Within Functional Limits  Orientation Level: Oriented X4         ADL  Feeding  Assistance Level: Independent; Set-up  Grooming/Oral Hygiene  Assistance Level: Set-up; Stand by assist  Upper Extremity Bathing  Assistance Level: Minimal assistance;Set-up  Lower Extremity Bathing  Assistance Level: Maximum assistance;Set-up  Upper Extremity Dressing  Assistance Level: Minimal assistance  Lower Extremity Dressing  Assistance Level: Maximum assistance;Set-up  Putting On/Taking Off Footwear  Assistance Level: Dependent  Toileting  Assistance Level: Moderate assistance; Requires x 2 assistance  Skilled Clinical Factors: Constant cues on various tech with pt. displaying difficulty finding position to urinate with use of grab bars and RW vs. sitting. Toilet Transfers  Technique: Stand step  Equipment: Standard toilet;Grab bars  Additional Factors: Increased time to complete;Cues for hand placement;Verbal cues; Set-up  Assistance Level: Moderate assistance; Requires x 2 assistance  Skilled Clinical Factors: Mod assist x2 sit to stand from various surfaces. Functional Mobility  Device: Rolling walker  Activity: To/From bathroom  Assistance Level: Moderate assistance; Requires x 2 assistance  Bed Mobility  Overall Assistance Level: Minimal Assistance; Moderate Assistance  Additional Factors: Set-up; Verbal cues; Increased time to complete; With handrails  Roll Left  Assistance Level: Contact guard assist  Roll Right  Assistance Level: Contact guard assist  Sit to Supine  Assistance Level: Moderate assistance  Skilled Clinical Factors: Mod assist for positioning feet up in bed. Supine to Sit  Assistance Level: Minimal assistance  Transfers  Surface: From bed;Standard toilet  Additional Factors: Set-up; Verbal cues; Hand placement cues; Increased time to complete  Device: Walker  Sit to Stand  Assistance Level: Moderate assistance; Requires x 2 assistance  Stand to Sit  Assistance Level: Moderate assistance; Requires x 2 assistance  Skilled Clinical Factors: Max vebal cues on tech  Bed To/From Chair  Assistance Level: Moderate assistance; Requires x 2 assistance         Assessment  Assessment  Assessment: Pt. completed full ADLS with toilet transfer with mod assist x2 for safety with constant cues on proper tech. Pt. fatigued easily and required frequent rest breaks from pain and discomfort with mobility. Nursing aware of pain levels. Pt. positioned in recliner with ANGELA hose in place. Education completed regarding safety and importance of mobility to promote healing. Skilled OT warranted to promote I/safety to return pt to prior living arrangement with assist as needed. Activity Tolerance: Patient limited by fatigue;Patient limited by pain;Treatment limited secondary to decreased cognition  Discharge Recommendations: Patient would benefit from continued therapy after discharge  OT Equipment Recommendations  Equipment Needed: Yes  Mobility Devices: Thamas Guido: Liseth 9 in place: Yes  Type of devices: All fall risk precautions in place;Call light within reach; Chair alarm in place; Left in chair;Gait belt;Nurse notified  Restraints  Initially in place: No    Patient Education  Education  Education Given To: Patient; Family  Education Provided: Role of Therapy;Plan of Care;Safety;ADL Function;Mobility Training;Transfer Training;Equipment  Education Provided Comments: Pt. educated on safety awareness and proper transfer tech. for ADL transfers. Pt. appeared attentive and receptive to information, however, education was continueous throughout treatment d/t little carryover.   Education Method: Demonstration;Verbal  Barriers to Learning: Cognition  Education Outcome: Verbalized understanding;Demonstrated understanding    Plan  Plan  Times per Week: 5-6x/week; 1-2x/day as chris  Times per Day: Daily  Current Treatment Recommendations: Strengthening;ROM;Balance training;Functional mobility training; Endurance training;Pain management; Safety education & training;Patient/Caregiver education & training;Equipment evaluation, education, & procurement;Positioning;Self-Care / ADL; Home management training;Cognitive/Perceptual training  Plan Comment: Cont with stated POC    Goals  Patient Goals   Patient goals : To be able to walk  Short Term Goals  Time Frame for Short term goals: By discharge, pt will  Short Term Goal 1: Demo MI with bed mob tasks with use of rails as needed. Short Term Goal 2: Demo CGAx1 with ADL transfers and apporp AD/DME and good safety  Short Term Goal 3: Demo CGA with functional mob in room distances with approp AD and good safety/pacing for ADL completion. Short Term Goal 4: Demo LB ADLs to Min A with use of AD/AE as needed and good safety  Short Term Goal 5: Demo and verb good understanding of fall prevention, EC/WS, ANGELA hose wear/care, possible equip needs, pain management strategies, and discharge recommendations. Additional Goals?: No    AM-PAC Score        AM-[facility] Inpatient Daily Activity Raw Score: 13 (08/04/22 1046)  AM-PAC Inpatient ADL T-Scale Score : 32.03 (08/04/22 1046)  ADL Inpatient CMS 0-100% Score: 63.03 (08/04/22 1046)  ADL Inpatient CMS G-Code Modifier : CL (08/04/22 1046)      Therapy Time   Co-treatment Concurrent Group Co-treatment   Time In 8770     4474   Time Out 0950     1200   Minutes 62     19       Co-treatment with PT warranted secondary to decreased safety and independence requiring 2 skilled therapy professionals to address individual discipline's goals.  OT addressing preparation for ADL transfer, sitting balance for increased ADL performance, sitting/activity tolerance, functional reaching, environmental safety/scanning, fall prevention, functional mobility for ADL transfers, ability to sequence and follow directions, bed

## 2022-08-04 NOTE — PLAN OF CARE
Problem: Discharge Planning  Goal: Discharge to home or other facility with appropriate resources  8/4/2022 1144 by Mario Hernandez RN  Outcome: Progressing     Problem: Pain  Goal: Verbalizes/displays adequate comfort level or baseline comfort level  8/4/2022 1144 by Mario Hernandez RN  Outcome: Progressing     Problem: ABCDS Injury Assessment  Goal: Absence of physical injury  8/4/2022 1144 by Mario Hernandez RN  Outcome: Progressing  Flowsheets (Taken 8/4/2022 1143)  Absence of Physical Injury: Implement safety measures based on patient assessment     Problem: Safety - Adult  Goal: Free from fall injury  8/4/2022 1144 by Mario Hernandez RN  Outcome: Progressing  Flowsheets (Taken 8/4/2022 1143)  Free From Fall Injury:   Instruct family/caregiver on patient safety   Based on caregiver fall risk screen, instruct family/caregiver to ask for assistance with transferring infant if caregiver noted to have fall risk factors     Problem: Skin/Tissue Integrity - Adult  Goal: Incisions, wounds, or drain sites healing without S/S of infection  8/4/2022 1144 by Mario Hernandez RN  Outcome: Progressing     Problem: Musculoskeletal - Adult  Goal: Return mobility to safest level of function  8/4/2022 1144 by Mario Hernandez RN  Outcome: Progressing     Problem: Musculoskeletal - Adult  Goal: Return ADL status to a safe level of function  8/4/2022 1144 by Mario Hernandez RN  Outcome: Progressing

## 2022-08-04 NOTE — PROGRESS NOTES
Physical Therapy  Facility/Department: [facility] MED SURG  Rehabilitation Physical Therapy Treatment Note    NAME: Samuel Cooley  : 1950 (56 y.o.)  MRN: [id number]  CODE STATUS: Full Code    Date of Service: 22   Pt currently functioning below baseline. Recommend daily inpatient skilled therapy at time of discharge to maximize long term outcomes and prevent re-admission. Please refer to AM-PAC score for current level of function. Restrictions:  Restrictions/Precautions: General Precautions; Fall Risk;Surgical Protocols; Up as Tolerated;Weight Bearing  Lower Extremity Weight Bearing Restrictions  Left Lower Extremity Weight Bearing: Weight Bearing As Tolerated     SUBJECTIVE  Subjective  Subjective: Patient in recliner upon therapists arrival.  LEROY Saini states patient is appropriate for PT treatment  Pain: Patient with c/o pain on RLE non operative side throughout treatment. OBJECTIVE  Cognition  Overall Cognitive Status: Exceptions  Arousal/Alertness: Appropriate responses to stimuli  Following Commands: Follows one step commands consistently; Follows one step commands with repetition  Attention Span: Appears intact  Memory: Appears intact  Safety Judgement: Decreased awareness of need for assistance;Decreased awareness of need for safety  Problem Solving: Assistance required to generate solutions;Assistance required to implement solutions  Insights: Fully aware of deficits  Initiation: Requires cues for some  Sequencing: Requires cues for some  Cognition Comment: Max verbal cues for safety awareness to follow proper tech with use of RW and mobility. Orientation  Overall Orientation Status: Within Functional Limits  Orientation Level: Oriented X4    Functional Mobility  Bed Mobility  Overall Assistance Level: Moderate Assistance; Requires x 2 Assistance  Additional Factors: Set-up; Verbal cues; Increased time to complete; Head of bed raised; With handrails  Roll Left  Assistance Level:  Moderate however reverts back to picking it up several times then reeducated on proper use. Environmental Mobility  Ambulation  Surface: Level surface  Device: Rolling walker  Distance: 100 feet x1 15 feet to restroom  Activity: Within Unit  Activity Comments: Patietn with inconsistant follow through on proper use of assistive device. Patient requries MAX encouragement and education throughout treatement. Additional Factors: Set-up; Verbal cues; Hand placement cues; Increased time to complete  Assistance Level: Moderate assistance; Requires x 2 assistance  Gait Deviations: Slow eliecer;Decreased step length bilateral;Unsteady gait      Neuromuscular Education  NDT Treatment: Gait ;Lower extremity; Sitting;Standing      PT Exercises  A/AROM Exercises: quad and glute sets, ankle pumps, heel slides      ASSESSMENT/PROGRESS TOWARDS GOALS    Assessment  Assessment: Patient s/p TKA LLE. Patient with progression in ambulation. Patient would benefit from continued skilled PT treatment to maximize return to PLOF. Activity Tolerance: Patient limited by fatigue;Patient limited by pain;Treatment limited secondary to decreased cognition  Discharge Recommendations: Patient would benefit from continued therapy after discharge  PT Equipment Recommendations  Equipment Needed: Yes  Walker: Rolling    Goals  Patient Goals   Patient goals : return home safely  Short Term Goals  Time Frame for Short term goals: 12 visits  Short term goal 1: Inc bed mobility to [employer] term goal 2:  Inc STS transfers to [address] with RW  Short term goal 3: Initiate and assess gait goals as appropriate  Short term goal 4: Pt to ascend/ descend 2 stairs without handrails Pranav  Short term goal 5: Pt to tolerate 30 minutes of daily PT for ther act, ther ex, balance, and gait    PLAN OF CARE/SAFETY  Plan  Plan: 2 times a day 7 days a week  Plan weeks: 1-2 x/day, 6-7 d/week  Current Treatment Recommendations: Strengthening;ROM;Balance training;Functional mobility training;Transfer training;ADL/Self-care training; Endurance training;Gait training;Stair training;Neuromuscular re-education;Home exercise program;Safety education & training;Patient/Caregiver education & training;Equipment evaluation, education, & procurement; Therapeutic activities  Safety Devices  Type of Devices: Call light within reach;Gait belt;Nurse notified; All fall risk precautions in place; Patient at risk for falls; Chair alarm in place; Left in chair  Restraints  Restraints Initially in Place: Yes  Restraints: all 3 bedrails in place    EDUCATION  Education  Education Given To: Patient  Education Provided: Role of Therapy;Precautions; Safety;Transfer Training;Mobility Training  Education Method: Demonstration;Verbal;Teach Back  Barriers to Learning: None  Education Outcome: Verbalized understanding;Continued education needed        Therapy Time   Individual Concurrent Group Co-treatment   Time In 3628         Time Out 1200         Minutes 22           Co-treatment with OT warranted secondary to decreased safety and independence requiring 2 skilled therapy professionals to address individual discipline's goals. PT addressing pre gait trunk strengthening, weight shifting prior to transfers, transfer training, and postural control in sitting.    Monie Ross PTA, 08/04/22 at 12:38 PM

## 2022-08-04 NOTE — PLAN OF CARE
Problem: Discharge Planning  Goal: Discharge to home or other facility with appropriate resources  8/4/2022 0054 by Del Yip RN  Outcome: Progressing     Problem: Pain  Goal: Verbalizes/displays adequate comfort level or baseline comfort level  8/4/2022 0054 by Del Yip RN  Outcome: Progressing     Problem: ABCDS Injury Assessment  Goal: Absence of physical injury  8/4/2022 0054 by Del Yip RN  Outcome: Progressing     Problem: Safety - Adult  Goal: Free from fall injury  8/4/2022 0054 by Del Yip RN  Outcome: Progressing     Problem: Skin/Tissue Integrity - Adult  Goal: Incisions, wounds, or drain sites healing without S/S of infection  8/4/2022 0054 by Del Yip RN  Outcome: Progressing     Problem: Discharge Planning  Goal: Discharge to home or other facility with appropriate resources  8/4/2022 0054 by Del Yip RN  Outcome: Progressing  8/3/2022 1521 by Vandana Loza RN  Outcome: Not Progressing     Problem: Pain  Goal: Verbalizes/displays adequate comfort level or baseline comfort level  8/4/2022 0054 by Del Yip RN  Outcome: Progressing  8/3/2022 1521 by Vandana Loza RN  Outcome: Not Progressing  8/3/2022 1141 by Janiya Hoang RN  Flowsheets (Taken 8/3/2022 1141)  Verbalizes/displays adequate comfort level or baseline comfort level:   Encourage patient to monitor pain and request assistance   Administer analgesics based on type and severity of pain and evaluate response   Assess pain using appropriate pain scale   Implement non-pharmacological measures as appropriate and evaluate response     Problem: ABCDS Injury Assessment  Goal: Absence of physical injury  8/4/2022 0054 by Del Yip RN  Outcome: Progressing  8/3/2022 1521 by Vandana Loza RN  Outcome: Not Progressing  Flowsheets (Taken 8/3/2022 1518)  Absence of Physical Injury: Implement safety measures based on patient assessment     Problem: Safety - Adult  Goal: Free from fall injury  8/4/2022 3622 by Amari Sumner RN  Outcome: Progressing  8/3/2022 1521 by Michelle Corona RN  Outcome: Not Progressing  Flowsheets (Taken 8/3/2022 1518)  Free From Fall Injury:   Based on caregiver fall risk screen, instruct family/caregiver to ask for assistance with transferring infant if caregiver noted to have fall risk factors   Instruct family/caregiver on patient safety  8/3/2022 1141 by Chayo Albright RN  Flowsheets (Taken 8/3/2022 1141)  Free From Fall Injury:   Based on caregiver fall risk screen, instruct family/caregiver to ask for assistance with transferring infant if caregiver noted to have fall risk factors   Instruct family/caregiver on patient safety     Problem: Skin/Tissue Integrity - Adult  Goal: Incisions, wounds, or drain sites healing without S/S of infection  8/4/2022 0054 by Amari Sumner RN  Outcome: Progressing  8/3/2022 1521 by Michelle Corona RN  Outcome: Not Progressing     Problem: Musculoskeletal - Adult  Goal: Return mobility to safest level of function  8/4/2022 0054 by Amari Sumner RN  Outcome: Progressing  8/3/2022 1521 by Michelle Corona RN  Outcome: Not Progressing  8/3/2022 1141 by Chayo Albright RN  Outcome: Progressing  Flowsheets (Taken 8/3/2022 1141)  Return Mobility to Safest Level of Function:   Assess patient stability and activity tolerance for standing, transferring and ambulating with or without assistive devices   Assist with transfers and ambulation using safe patient handling equipment as needed   Ensure adequate protection for wounds/incisions during mobilization   Obtain physical therapy/occupational therapy consults as needed   Instruct patient/family in ordered activity level  Goal: Return ADL status to a safe level of function  8/4/2022 0054 by Amari Sumner RN  Outcome: Progressing  8/3/2022 1521 by Michelle Corona RN  Outcome: Not Progressing  8/3/2022 1141 by Chayo Albright RN  Flowsheets (Taken 8/3/2022 1141)  Return ADL Status to a Safe Level of Function:   Obtain physical therapy/occupational therapy consults as needed   Administer medication as ordered   Assist and instruct patient to increase activity and self care as tolerated   Assess activities of daily living deficits and provide assistive devices as needed

## 2022-08-04 NOTE — CARE COORDINATION
Social Work-Spoke with Deidra from [address] [place] [address]. She states that patient will not meet criteria for acute rehab. She would authorize [facility] and provided auth number of [id number]. Precert is valid through [facility]. Discussed with [facility]. They will admit tonight. Discussed transportation. Wife plans on transporting. Orders faxed. Nurse to call report to [phone]. Joselito Vitale completed. Pt/wife are agreeable with dc plans.  [facility]

## 2022-08-18 ENCOUNTER — OFFICE VISIT (OUTPATIENT)
Dept: ORTHOPEDIC SURGERY | Age: 72
End: 2022-08-18

## 2022-08-18 VITALS — HEIGHT: 72 IN | BODY MASS INDEX: 41.85 KG/M2 | WEIGHT: 309 LBS

## 2022-08-18 DIAGNOSIS — M25.562 LEFT KNEE PAIN, UNSPECIFIED CHRONICITY: Primary | ICD-10-CM

## 2022-08-18 PROCEDURE — 99024 POSTOP FOLLOW-UP VISIT: CPT | Performed by: ORTHOPAEDIC SURGERY

## 2022-08-21 ASSESSMENT — ENCOUNTER SYMPTOMS
SHORTNESS OF BREATH: 0
EYE DISCHARGE: 0
ABDOMINAL PAIN: 0
ROS SKIN COMMENTS: NEGATIVE FOR RASH

## 2022-08-21 NOTE — PROGRESS NOTES
[facility] AND SP[facility]TS MEDICINE  [address] [facility]  [address] [address]  Dept: [phone]  Dept Fax: [phone]        Postoperative follow-up note    Subjective:   Graham Fontana is a 67y.o. year old male who presents to our office today for postoperative followup regarding his   1. Left knee pain, unspecified chronicity    . Chief Complaint   Patient presents with    Post-Op Check     Left TKA DOS 8/2/2022     Jo Ann Chávez is a 27-year-old male who presents to the office today status post left total knee arthroplasty 8/2/2022. The patient notes that he is doing well and working hard with physical therapy. Review of Systems   Constitutional:  Positive for activity change. Negative for fever. HENT:  Negative for dental problem. Eyes:  Negative for discharge. Respiratory:  Negative for shortness of breath. Cardiovascular:  Negative for chest pain. Gastrointestinal:  Negative for abdominal pain. Genitourinary: Negative. Musculoskeletal:  Positive for arthralgias. Skin:         Negative for rash   Neurological:  Positive for weakness. Psychiatric/Behavioral:  Negative for confusion. I have reviewed the CC, HPI, ROS, PMH, FHX, Social History, and if not present in this note, I have reviewed in the patient's chart. I agree with the documentation provided by other staff and have reviewed their documentation prior to providing my signature indicating agreement. Objective :   General: Graham Fontana is a 67 y.o. male who is alert and oriented and sitting comfortably in our office. Ortho Exam  MS:   Incision left knee is healing well without signs of infection. Mild. Staple erythema is appreciated. Staples were applied secondary to the patient's adhesive allergy. Patient ambulates with minimal short weightbearing phase and range of motion is 5-125 degrees actively.   Motor, sensory, vascular examination of the left lower extremity is grossly intact without focal deficits. Neuro: alert. oriented  Eyes: Extra-ocular muscles intact  Mouth: Oral mucosa moist. No perioral lesions  Pulm: Respirations unlabored and regular. Skin: warm, well perfused  Psych:   Patient has good fund of knowledge and displays understanging of exam, diagnosis, and plan. Radiology: XR KNEE LEFT (3 VIEWS)    Result Date: 8/18/2022  History:  Left  total knee arthroplasty Findings: AP, lateral, merchant view x-rays of the Left  knee done in the office standing today shows Left total knee arthroplasty in good position without  complications. No loosening of components is appreciated. No evidence of fracture, subluxation, dislocation, radiopaque foreign body, radiopaque tumors noted. Alignment is near-anatomic. Impression: Status post Left total knee arthroplasty as described above. XR KNEE LEFT (3 VIEWS)    Result Date: 8/2/2022  EXAMINATION: THREE XRAY VIEWS OF THE LEFT KNEE 8/2/2022 1:32 pm COMPARISON: 06/01/2022 HISTORY: ORDERING SYSTEM PROVIDED HISTORY: Ap, lateral, merchant post op pacu thanks TECHNOLOGIST PROVIDED HISTORY: Ap, lateral, merchant post op pacu thanks Reason for Exam: Post op FINDINGS: A total knee arthroplasty has been performed. Subcutaneous gas is noted consistent with postoperative status. There is no fracture or malalignment identified. Expected immediate postoperative findings status post knee arthroplasty. Assessment:      1. Left knee pain, unspecified chronicity           Plan:        . He is doing well. He is going to continue with the therapy as he has been doing. We are going to see him back in 1 week for nursing visit only for staple removal and in 4 weeks for further clinical follow-up. Follow up: No follow-ups on file. No orders of the defined types were placed in this encounter.       Orders Placed This Encounter   Procedures    XR KNEE LEFT (3 VIEWS) Standing Status:   Future     Number of Occurrences:   1     Standing Expiration Date:   8/15/2023       This note is created with the assistance of a speech recognition program.  While intending to generate a document that actually reflects the content of the visit, the document can still have some errors including those of syntax and sound a like substitutions which may escape proof reading.   In such instances, actual meaning can be extrapolated by contextual diversion      Electronically signed by Ankit Gillis DO, Gregoria Fam on 8/21/2022 at 2:03 PM

## 2022-08-25 ENCOUNTER — OFFICE VISIT (OUTPATIENT)
Dept: ORTHOPEDIC SURGERY | Age: 72
End: 2022-08-25

## 2022-08-25 VITALS — BODY MASS INDEX: 41.85 KG/M2 | WEIGHT: 309 LBS | HEIGHT: 72 IN

## 2022-08-25 DIAGNOSIS — M17.12 ARTHRITIS OF LEFT KNEE: Primary | ICD-10-CM

## 2022-08-25 DIAGNOSIS — Z96.652 S/P TOTAL KNEE ARTHROPLASTY, LEFT: ICD-10-CM

## 2022-08-25 PROCEDURE — 99024 POSTOP FOLLOW-UP VISIT: CPT | Performed by: ORTHOPAEDIC SURGERY

## 2022-08-25 RX ORDER — CEPHALEXIN 500 MG/1
500 CAPSULE ORAL 4 TIMES DAILY
Qty: 28 CAPSULE | Refills: 0 | Status: SHIPPED | OUTPATIENT
Start: 2022-08-25 | End: 2022-09-01

## 2022-08-25 NOTE — PROGRESS NOTES
Date of Procedure: 8/2/2022     Pre-Op Diagnosis: Left knee osteoarthritis     Post-Op Diagnosis: Left knee osteoarthritis       Procedure(s): Left cementless total knee arthroplasty    Patient presented to the office today for post-op staple removal. Incision site clean, dry and intact. Mild erythema. Staples removed from left knee with no complaints. Upon removal of staples writer noted small amount of serosanguineous fluid. Dr. Blue Hawley came in to view incision site. Ordered Keflex 500mg P.O QID for 7 days prophylactically. Patient to keep follow up for 3 weeks. Patient to call the office and notify staff if incision becomes worse. Patient able to state s/s of infection per teach back method.

## 2022-09-15 ENCOUNTER — OFFICE VISIT (OUTPATIENT)
Dept: ORTHOPEDIC SURGERY | Age: 72
End: 2022-09-15

## 2022-09-15 VITALS — WEIGHT: 297 LBS | BODY MASS INDEX: 40.23 KG/M2 | HEIGHT: 72 IN

## 2022-09-15 DIAGNOSIS — M17.12 ARTHRITIS OF LEFT KNEE: Primary | ICD-10-CM

## 2022-09-15 DIAGNOSIS — Z96.652 S/P TOTAL KNEE ARTHROPLASTY, LEFT: ICD-10-CM

## 2022-09-15 PROCEDURE — 99024 POSTOP FOLLOW-UP VISIT: CPT | Performed by: ORTHOPAEDIC SURGERY

## 2022-09-15 ASSESSMENT — ENCOUNTER SYMPTOMS
ABDOMINAL PAIN: 0
ROS SKIN COMMENTS: NEGATIVE FOR RASH
EYE DISCHARGE: 0
SHORTNESS OF BREATH: 0

## 2022-10-27 ENCOUNTER — OFFICE VISIT (OUTPATIENT)
Dept: ORTHOPEDIC SURGERY | Age: 72
End: 2022-10-27

## 2022-10-27 VITALS — WEIGHT: 294 LBS | BODY MASS INDEX: 39.82 KG/M2 | HEIGHT: 72 IN

## 2022-10-27 DIAGNOSIS — Z96.652 S/P TOTAL KNEE ARTHROPLASTY, LEFT: ICD-10-CM

## 2022-10-27 DIAGNOSIS — M17.12 ARTHRITIS OF LEFT KNEE: Primary | ICD-10-CM

## 2022-10-27 PROCEDURE — 99024 POSTOP FOLLOW-UP VISIT: CPT | Performed by: ORTHOPAEDIC SURGERY

## 2022-10-27 ASSESSMENT — ENCOUNTER SYMPTOMS
ABDOMINAL PAIN: 0
ROS SKIN COMMENTS: NEGATIVE FOR RASH
SHORTNESS OF BREATH: 0
EYE DISCHARGE: 0

## 2022-10-27 NOTE — PROGRESS NOTES
[facility]  [address] [facility]  [address] [address]  Dept: [phone]  Dept Fax: [phone]        Postoperative follow-up note    Subjective:   Bossman Whitehead is a 67y.o. year old male who presents to our office today for postoperative followup regarding his   1. Arthritis of left knee    2. S/P total knee arthroplasty, left    . Chief Complaint   Patient presents with    Knee Pain     3rd PO L knee     Billie Cogan is a 62-year-old male who presents the office today for recheck status post left total knee arthroplasty on 8/2/2022. He is happy with the results so far. He uses a cane for long distance walking. He continues his rehab process. Review of Systems   Constitutional:  Positive for activity change. Negative for fever. HENT:  Negative for dental problem. Eyes:  Negative for discharge. Respiratory:  Negative for shortness of breath. Cardiovascular:  Negative for chest pain. Gastrointestinal:  Negative for abdominal pain. Genitourinary: Negative. Musculoskeletal:  Positive for arthralgias. Skin:         Negative for rash   Neurological:  Positive for weakness. Psychiatric/Behavioral:  Negative for confusion. I have reviewed the CC, HPI, ROS, PMH, FHX, Social History, and if not present in this note, I have reviewed in the patient's chart. I agree with the documentation provided by other staff and have reviewed their documentation prior to providing my signature indicating agreement. Objective :   General: Bossman Whitehead is a 67 y.o. male who is alert and oriented and sitting comfortably in our office. Ortho Exam  MS:   Incision left knee is healing well without signs of infection. Range of motion is 3 to 125 degrees actively. The patient ambulates without antalgia or short weightbearing phase. Calves are supple bilaterally.   Motor, sensory, vascular examination of the left lower extremity is grossly intact without focal deficits. Neuro: alert. oriented  Eyes: Extra-ocular muscles intact  Mouth: Oral mucosa moist. No perioral lesions  Pulm: Respirations unlabored and regular. Skin: warm, well perfused  Psych:   Patient has good fund of knowledge and displays understanging of exam, diagnosis, and plan. Radiology:     No results found. Assessment:      1. Arthritis of left knee    2. S/P total knee arthroplasty, left         Plan:      Patient is doing well. He can continue his home exercise program.  We discussed dental prophylaxis today. I plan to see the patient back in 1 year for clinical radiographic evaluation or sooner as necessary. Follow up: No follow-ups on file. No orders of the defined types were placed in this encounter. No orders of the defined types were placed in this encounter. This note is created with the assistance of a speech recognition program.  While intending to generate a document that actually reflects the content of the visit, the document can still have some errors including those of syntax and sound a like substitutions which may escape proof reading.   In such instances, actual meaning can be extrapolated by contextual diversion      Electronically signed by Tony Carrillo DO, Herschel Balzarine on 10/27/2022 at 6:08 PM

## 2023-01-05 ENCOUNTER — OFFICE VISIT (OUTPATIENT)
Dept: ORTHOPEDIC SURGERY | Age: 73
End: 2023-01-05
Payer: MEDICARE

## 2023-01-05 VITALS — WEIGHT: 275 LBS | HEIGHT: 72 IN | BODY MASS INDEX: 37.25 KG/M2

## 2023-01-05 DIAGNOSIS — Z96.652 S/P TOTAL KNEE ARTHROPLASTY, LEFT: Primary | ICD-10-CM

## 2023-01-05 PROCEDURE — 1123F ACP DISCUSS/DSCN MKR DOCD: CPT | Performed by: PHYSICIAN ASSISTANT

## 2023-01-05 PROCEDURE — 99214 OFFICE O/P EST MOD 30 MIN: CPT | Performed by: PHYSICIAN ASSISTANT

## 2023-01-05 RX ORDER — CEPHALEXIN 500 MG/1
500 CAPSULE ORAL 4 TIMES DAILY
Qty: 28 CAPSULE | Refills: 0 | Status: SHIPPED | OUTPATIENT
Start: 2023-01-05

## 2023-01-05 RX ORDER — IBUPROFEN 800 MG/1
800 TABLET ORAL PRN
COMMUNITY

## 2023-01-05 RX ORDER — TIZANIDINE HYDROCHLORIDE 4 MG/1
CAPSULE, GELATIN COATED ORAL
COMMUNITY

## 2023-01-05 NOTE — PROGRESS NOTES
[address]  [address]  Dept: [phone]  Dept Fax: [phone]        Postoperative follow-up note    Subjective:   Christina Estrella is a 67y.o. year old male who presents to our office today for postoperative followup regarding his   1. S/P total knee arthroplasty, left        Chief Complaint   Patient presents with    Wound Check     L knee wound  L TKA-8/2/22       Date of Surgery: 8/2/2022    Christina Estrella  is a 67y.o. year old male who presents to our office today for postoperative follow up after having undergone a left total knee arthroplasty on 8/2/2022 completed by Dr. Adrianna Sacks, DO. The patient denies fevers, chills, nausea, vomiting, diarrhea. The patient completed formal outpatient therapy over the last 2 weeks he has noted his left knee the incision has been located. Over the last 5 to 7 days that has been drainage from the area. He denies trauma or bumping the area. Patient denies any increase in swelling, skin redness or feelings of nausea, vomiting or diarrhea. He is here today for left knee wound check. Review of Systems   Constitutional:  Negative for activity change and fever. HENT:  Negative for sneezing. Respiratory:  Negative for cough and shortness of breath. Cardiovascular:  Negative for chest pain. Gastrointestinal:  Negative for vomiting. Musculoskeletal:  Negative for arthralgias, joint swelling and myalgias. Skin:  Negative for color change. Neurological:  Negative for weakness and numbness. Psychiatric/Behavioral:  Negative for sleep disturbance. Objective :   General: Christina Estrella is a 67 y.o. male who is alert and oriented and sitting comfortably in our office. Ortho Exam  MS:   Patient ambulates independently without a limp appreciated.   Evaluation of the left knee reveals a well-healed surgical incision with an area of eschar along the distal one third of the incision (picture below - after betadyne prep). There is no active drainage, bleeding or dehiscence appreciated. Possible, superficial suture abscess appreciated. No exposed suture appreciated. There is no gross erythema, edema or signs of infection appreciated to the left knee. AROM left knee:3-110. NO Calf tenderness noted with palpation. Motor,  Sensory, vascular examination of the Left lower extremities grossly intact without focal deficits. Neuro: alert. oriented  Eyes: Extra-ocular muscles intact  Mouth: Oral mucosa moist. No perioral lesions  Pulm: Respirations unlabored and regular. Skin: warm, well perfused  Psych:   Patient has good fund of knowledge and displays understanging of exam, diagnosis, and plan. Radiology:  No results found. Assessment:      1. S/P total knee arthroplasty, left         Plan:      Nimco Calhoun is a 78-year-old male that is currently 5 months postoperative after undergoing a left total knee arthroplasty on 8/2/2022 completed by Dr. Addy Coley DO. Overall the patient is doing well but has noticed anterior aspect of the left knee that has opened and/scabbed over the last 2 weeks. After full evaluation today in office patient may have an area of deep suture that is trying to reach the surface, I was unable to pull suture from the area but it was cleaned and the eschar was removed. There is no active drainage or gross signs of infection noted from the area of concern. Patient was given a prophylactic antibiotic of Keflex, 500 mg 4 times daily x7 days to take if he notices an increase in redness, drainage from the area or if it does not continue to heal properly. I discussed with the patient that if he begins the antibiotic that he should come into our office immediately for further evaluation, otherwise he is to follow up 1 year post-operatively for routine surveillance x-rays of the left knee, or sooner if needed.     He noted his understanding. The  patient was instructed to call our office with any questions or concerns prior to the next appointment. The patient voiced their understanding. Follow up: Return in about 7 months (around 8/2/2023) for re-evaluation with left knee x-rays. Orders Placed This Encounter   Medications    cephALEXin (KEFLEX) 500 MG capsule     Sig: Take 1 capsule by mouth 4 times daily     Dispense:  28 capsule     Refill:  0       No orders of the defined types were placed in this encounter. This note is created with the assistance of a speech recognition program.  While intending to generate a document that actually reflects the content of the visit, the document can still have some errors including those of syntax and sound a like substitutions which may escape proof reading. In such instances, actual meaning can be extrapolated by contextual diversion.      Electronically signed by Hina Vaughn PA-C on 1/9/2023 at 2:00 PM

## 2023-01-09 ASSESSMENT — ENCOUNTER SYMPTOMS
SHORTNESS OF BREATH: 0
VOMITING: 0
COUGH: 0
COLOR CHANGE: 0

## 2024-03-17 ENCOUNTER — HOSPITAL ENCOUNTER (EMERGENCY)
Age: 74
Discharge: HOME | End: 2024-03-17
Attending: EMERGENCY MEDICINE
Payer: MEDICARE

## 2024-03-17 ENCOUNTER — APPOINTMENT (OUTPATIENT)
Dept: GENERAL RADIOLOGY | Age: 74
End: 2024-03-17
Payer: MEDICARE

## 2024-03-17 VITALS
DIASTOLIC BLOOD PRESSURE: 76 MMHG | HEART RATE: 61 BPM | SYSTOLIC BLOOD PRESSURE: 150 MMHG | OXYGEN SATURATION: 91 % | TEMPERATURE: 98.1 F | RESPIRATION RATE: 14 BRPM

## 2024-03-17 DIAGNOSIS — R42 DIZZINESS: Primary | ICD-10-CM

## 2024-03-17 LAB
ANION GAP SERPL CALCULATED.3IONS-SCNC: 11 MMOL/L (ref 9–16)
BASOPHILS # BLD: 0.09 K/UL (ref 0–0.2)
BASOPHILS NFR BLD: 1 % (ref 0–2)
BUN SERPL-MCNC: 18 MG/DL (ref 8–23)
CALCIUM SERPL-MCNC: 9.2 MG/DL (ref 8.6–10.4)
CHLORIDE SERPL-SCNC: 103 MMOL/L (ref 98–107)
CO2 SERPL-SCNC: 25 MMOL/L (ref 20–31)
CREAT SERPL-MCNC: 1.5 MG/DL (ref 0.7–1.2)
EOSINOPHIL # BLD: 0.27 K/UL (ref 0–0.44)
EOSINOPHILS RELATIVE PERCENT: 3 % (ref 1–4)
ERYTHROCYTE [DISTWIDTH] IN BLOOD BY AUTOMATED COUNT: 12 % (ref 11.8–14.4)
GFR SERPL CREATININE-BSD FRML MDRD: 47 ML/MIN/1.73M2
GLUCOSE SERPL-MCNC: 137 MG/DL (ref 74–99)
HCT VFR BLD AUTO: 44.8 % (ref 40.7–50.3)
HGB BLD-MCNC: 15 G/DL (ref 13–17)
IMM GRANULOCYTES # BLD AUTO: <0.03 K/UL (ref 0–0.3)
IMM GRANULOCYTES NFR BLD: 0 %
LYMPHOCYTES NFR BLD: 2.12 K/UL (ref 1.1–3.7)
LYMPHOCYTES RELATIVE PERCENT: 26 % (ref 24–43)
MCH RBC QN AUTO: 31.8 PG (ref 25.2–33.5)
MCHC RBC AUTO-ENTMCNC: 33.5 G/DL (ref 28.4–34.8)
MCV RBC AUTO: 95.1 FL (ref 82.6–102.9)
MONOCYTES NFR BLD: 0.83 K/UL (ref 0.1–1.2)
MONOCYTES NFR BLD: 10 % (ref 3–12)
NEUTROPHILS NFR BLD: 60 % (ref 36–65)
NEUTS SEG NFR BLD: 5 K/UL (ref 1.5–8.1)
NRBC BLD-RTO: 0 PER 100 WBC
PLATELET # BLD AUTO: 274 K/UL (ref 138–453)
PMV BLD AUTO: 9.9 FL (ref 8.1–13.5)
POTASSIUM SERPL-SCNC: 3.9 MMOL/L (ref 3.7–5.3)
RBC # BLD AUTO: 4.71 M/UL (ref 4.21–5.77)
SODIUM SERPL-SCNC: 139 MMOL/L (ref 136–145)
TROPONIN I SERPL HS-MCNC: 17 NG/L (ref 0–22)
TROPONIN I SERPL HS-MCNC: 19 NG/L (ref 0–22)
WBC OTHER # BLD: 8.3 K/UL (ref 3.5–11.3)

## 2024-03-17 PROCEDURE — 80048 BASIC METABOLIC PNL TOTAL CA: CPT

## 2024-03-17 PROCEDURE — 71045 X-RAY EXAM CHEST 1 VIEW: CPT

## 2024-03-17 PROCEDURE — 2580000003 HC RX 258: Performed by: STUDENT IN AN ORGANIZED HEALTH CARE EDUCATION/TRAINING PROGRAM

## 2024-03-17 PROCEDURE — 85025 COMPLETE CBC W/AUTO DIFF WBC: CPT

## 2024-03-17 PROCEDURE — 93005 ELECTROCARDIOGRAM TRACING: CPT | Performed by: STUDENT IN AN ORGANIZED HEALTH CARE EDUCATION/TRAINING PROGRAM

## 2024-03-17 PROCEDURE — 84484 ASSAY OF TROPONIN QUANT: CPT

## 2024-03-17 PROCEDURE — 99285 EMERGENCY DEPT VISIT HI MDM: CPT

## 2024-03-17 RX ORDER — 0.9 % SODIUM CHLORIDE 0.9 %
1000 INTRAVENOUS SOLUTION INTRAVENOUS ONCE
Status: COMPLETED | OUTPATIENT
Start: 2024-03-17 | End: 2024-03-17

## 2024-03-17 RX ADMIN — SODIUM CHLORIDE 1000 ML: 9 INJECTION, SOLUTION INTRAVENOUS at 12:32

## 2024-03-17 NOTE — ED PROVIDER NOTES
[facility] ED     Emergency Department     Faculty Attestation    I performed a history and physical examination of the patient and discussed management with the resident. I reviewed the resident’s note and agree with the documented findings and plan of care. Any areas of disagreement are noted on the chart. I was personally present for the key portions of any procedures. I have documented in the chart those procedures where I was not present during the key portions. I have reviewed the emergency nurses triage note. I agree with the chief complaint, past medical history, past surgical history, allergies, medications, social and family history as documented unless otherwise noted below. For Physician Assistant/ Nurse Practitioner cases/documentation I have personally evaluated this patient and have completed at least one if not all key elements of the E/M (history, physical exam, and MDM). Additional findings are as noted.    1:24 PM EDT    Patient presents with dizziness.  Patient is a [employer] and was up on the [employer] when he became dizzy.  He says he never passed out or blacked out.  He says that he had fasted before mass so had not had anything to eat or drink yet this morning.  He denies any headache, chest pain, shortness of breath, abdominal pain, vomiting or diarrhea.  Patient did have influenza about 2 weeks ago but those symptoms have mostly resolved other than a little cough that is been lingering.  On arrival here, patient states that he now feels back to his normal self.  He no longer feels dizzy.  Lungs are clear to auscultation bilaterally and heart sounds are normal.  Abdomen is soft and nontender.  There are no focal neurologic deficits and patient is alert and oriented and answering questions appropriately.  Will get EKG and labs and reassess.    EKG Interpretation    Interpreted by emergency department physician    Rhythm: normal sinus  and 1 degree AV block  Rate:

## 2024-03-17 NOTE — DISCHARGE INSTRUCTIONS
Seen in the emergency department for dizziness.  Cardiac workup was unremarkable.  Your EKG showed a first-degree AV block which is not uncommon and is a normal variant.  Chest x-ray looked unremarkable as well.  You did have 2 troponin levels of 19 and then 17.  This is elevated from baseline however not concerning without the symptoms of chest pain and shortness of breath.  Please follow-up with your primary care within the next 1 to 2 days regarding this emergency department visit.  Please return to the emergency department if you develop chest pain, shortness of breath, worsening dizziness, episodes of passing out, visual changes, vomiting, or any other concerning symptoms.  Please continue to take all your medications as prescribed

## 2024-03-17 NOTE — ED PROVIDER NOTES
[facility] ED  Emergency Department Encounter  Emergency Medicine Resident     Pt Name:Cisco Bates  MRN: [id number]  Birthdate 1950  Date of evaluation: 3/17/24  PCP:  Deion Montaño MD  Note Started: 11:54 AM EDT      CHIEF COMPLAINT       Chief Complaint   Patient presents with    Dizziness       HISTORY OF PRESENT ILLNESS  (Location/Symptom, Timing/Onset, Context/Setting, Quality, Duration, Modifying Factors, Severity.)      Cisco Bates is a 73 y.o. male with PMH including HTN who presents emergency department via EMS with dizziness which occurred earlier today.  He arrives alert and oriented x 3 and well-appearing.  Per patient, he was fasting prior to Sunday mass.  He was up on the Alter today when he suddenly began to feel dizzy.  He denies vertigo symptoms, and states that he was simply unsteady on his feet.  He denies headache, visual changes, chest pain, shortness of breath, abdominal pain, N/V/D.  Additionally denies facial droop, slurred speech, numbness and tingling in any of his 4 extremities.  Positive orthostatics for EMS.  Patient is the  for the fire department.  Of note, patient recently was diagnosed with flu A 2 weeks ago    PAST MEDICAL / SURGICAL / SOCIAL / FAMILY HISTORY      has a past medical history of Cancer (HCC), Hypertension, Kidney stones, Neck complaint, Skin cancer, and UC (ulcerative colitis) ([facility]).     has a past surgical history that includes Endoscopy, colon, diagnostic; Colonoscopy; Cholecystectomy; skin biopsy; Tonsillectomy; joint replacement; Hip Arthroplasty; eye surgery; Cataract removal with implant (Bilateral); Ankle surgery (Right); Knee Arthroplasty (Left, 08/02/2022); and Total knee arthroplasty (Left, 8/2/2022).      Social History     Socioeconomic History    Marital status: Unknown     Spouse name: Not on file    Number of children: Not on file    Years of education: Not on file    Highest education level: Not on file

## 2024-03-17 NOTE — ED NOTES
Pt to ED via [facility] with c/o near syncopal episode at [employer]. States he began to feel dizzy as he was kneeling. Denies any loss of consciousness or fall. States he had to fast this morning prior to [employer]. States he was diagnosed with influenza 2 weeks ago. Per EMS, pt had positive orthostatics.  Denies any pain. Pt is a/ox4, attached to full cardiac monitor and continuous spo2, call light in reach, RR even and non labored, side rails up x2, bed in lowest position.

## 2024-03-19 LAB
EKG ATRIAL RATE: 61 BPM
EKG P AXIS: 27 DEGREES
EKG P-R INTERVAL: 266 MS
EKG Q-T INTERVAL: 422 MS
EKG QRS DURATION: 82 MS
EKG QTC CALCULATION (BAZETT): 424 MS
EKG R AXIS: 33 DEGREES
EKG T AXIS: 57 DEGREES
EKG VENTRICULAR RATE: 61 BPM

## 2024-03-19 PROCEDURE — 93010 ELECTROCARDIOGRAM REPORT: CPT | Performed by: INTERNAL MEDICINE

## (undated) DEVICE — SUTURE STRATAFIX SPRL SZ 1 L14IN ABSRB VLT L48CM CTX 1/2 SXPD2B405

## (undated) DEVICE — Device

## (undated) DEVICE — APPLICATOR MEDICATED 26 CC SOLUTION HI LT ORNG CHLORAPREP

## (undated) DEVICE — STERILE PATIENT PROTECTIVE PAD FOR IMP® KNEE POSITIONERS & COHESIVE WRAP (10 / CASE): Brand: DE MAYO KNEE POSITIONER®

## (undated) DEVICE — SUTURE VCRL SZ 0 L36IN ABSRB UD L36MM CT-1 1/2 CIR J946H

## (undated) DEVICE — DRESSING PETRO W3XL8IN OIL EMUL N ADH GZ KNIT IMPREG CELOS

## (undated) DEVICE — STAPLER, SKIN, 35W, A: Brand: MEDLINE INDUSTRIES, INC.

## (undated) DEVICE — DRAPE,REIN 53X77,STERILE: Brand: MEDLINE

## (undated) DEVICE — DRAPE,U/ SHT,SPLIT,PLAS,STERIL: Brand: MEDLINE

## (undated) DEVICE — SCREW BNE L25MM DIA2.5MM KNEE FULL THRD HEX FEM PERSONA
Type: IMPLANTABLE DEVICE | Site: KNEE | Status: NON-FUNCTIONAL
Removed: 2022-08-02

## (undated) DEVICE — SOLUTION IV IRRIG POUR BRL 0.9% SODIUM CHL 2F7124

## (undated) DEVICE — 450 ML BOTTLE OF 0.05% CHLORHEXIDINE GLUCONATE IN 99.95% STERILE WATER FOR IRRIGATION, USP AND APPLICATOR.: Brand: IRRISEPT ANTIMICROBIAL WOUND LAVAGE

## (undated) DEVICE — BANDAGE COMPR W6INXL10YD ST M E WHITE/BEIGE

## (undated) DEVICE — ZIPPERED TOGA, 2X LARGE: Brand: FLYTE, SURGICOOL

## (undated) DEVICE — SOLUTION IRRIG 3000ML 0.9% SOD CHL USP UROMATIC PLAS CONT

## (undated) DEVICE — SUTURE STRATAFIX SPRL SZ 2-0 L9IN ABSRB VLT MH L36MM 1/2 SXPD2B408

## (undated) DEVICE — HYPODERMIC SAFETY NEEDLE: Brand: MAGELLAN

## (undated) DEVICE — STOCKINETTE,IMPERVIOUS,12X48,STERILE: Brand: MEDLINE

## (undated) DEVICE — SUTURE STRATAFIX SPRL SZ 3-0 L5IN ABSRB UD FS L26MM 3/8 CIR SXMP2B411

## (undated) DEVICE — OSCILLATING TIP SAW CARTRIDGE: Brand: PRECISION FALCON

## (undated) DEVICE — OPTIFOAM GENTLE AG,POST-OP STRIP,3.5X14: Brand: MEDLINE

## (undated) DEVICE — ADHESIVE SKIN CLOSURE 0.7CC TOP MICROBIAL APPL DERMBND ADV

## (undated) DEVICE — SUTURE VCRL SZ 1 L36IN ABSRB UD L36MM CT-1 1/2 CIR J947H

## (undated) DEVICE — GLOVE SURG SZ 85 CRM LTX FREE POLYISOPRENE POLYMER BEAD ANTI